# Patient Record
Sex: FEMALE | Race: AMERICAN INDIAN OR ALASKA NATIVE | NOT HISPANIC OR LATINO
[De-identification: names, ages, dates, MRNs, and addresses within clinical notes are randomized per-mention and may not be internally consistent; named-entity substitution may affect disease eponyms.]

---

## 2019-04-25 PROBLEM — Z00.00 ENCOUNTER FOR PREVENTIVE HEALTH EXAMINATION: Status: ACTIVE | Noted: 2019-04-25

## 2019-05-01 ENCOUNTER — ASOB RESULT (OUTPATIENT)
Age: 24
End: 2019-05-01

## 2019-05-01 ENCOUNTER — APPOINTMENT (OUTPATIENT)
Dept: OBGYN | Facility: CLINIC | Age: 24
End: 2019-05-01
Payer: MEDICAID

## 2019-05-01 VITALS
WEIGHT: 119.25 LBS | HEART RATE: 80 BPM | DIASTOLIC BLOOD PRESSURE: 78 MMHG | BODY MASS INDEX: 19.16 KG/M2 | HEIGHT: 66 IN | SYSTOLIC BLOOD PRESSURE: 118 MMHG

## 2019-05-01 DIAGNOSIS — Z78.9 OTHER SPECIFIED HEALTH STATUS: ICD-10-CM

## 2019-05-01 DIAGNOSIS — O36.80X0 PREGNANCY WITH INCONCLUSIVE FETAL VIABILITY, NOT APPLICABLE OR UNSPECIFIED: ICD-10-CM

## 2019-05-01 PROCEDURE — 99202 OFFICE O/P NEW SF 15 MIN: CPT

## 2019-05-01 PROCEDURE — 76817 TRANSVAGINAL US OBSTETRIC: CPT

## 2019-05-03 ENCOUNTER — APPOINTMENT (OUTPATIENT)
Dept: OBGYN | Facility: CLINIC | Age: 24
End: 2019-05-03

## 2019-05-07 ENCOUNTER — OTHER (OUTPATIENT)
Age: 24
End: 2019-05-07

## 2019-05-09 ENCOUNTER — EMERGENCY (EMERGENCY)
Facility: HOSPITAL | Age: 24
LOS: 1 days | Discharge: HOME | End: 2019-05-09
Attending: EMERGENCY MEDICINE | Admitting: EMERGENCY MEDICINE
Payer: MEDICAID

## 2019-05-09 VITALS
TEMPERATURE: 98 F | RESPIRATION RATE: 16 BRPM | OXYGEN SATURATION: 100 % | DIASTOLIC BLOOD PRESSURE: 51 MMHG | HEART RATE: 98 BPM | SYSTOLIC BLOOD PRESSURE: 104 MMHG

## 2019-05-09 LAB
APPEARANCE UR: CLEAR — SIGNIFICANT CHANGE UP
BASOPHILS # BLD AUTO: 0.03 K/UL — SIGNIFICANT CHANGE UP (ref 0–0.2)
BASOPHILS NFR BLD AUTO: 0.4 % — SIGNIFICANT CHANGE UP (ref 0–2)
BILIRUB UR-MCNC: NEGATIVE — SIGNIFICANT CHANGE UP
BLOOD UR QL VISUAL: NEGATIVE — SIGNIFICANT CHANGE UP
COLOR SPEC: SIGNIFICANT CHANGE UP
EOSINOPHIL # BLD AUTO: 0.22 K/UL — SIGNIFICANT CHANGE UP (ref 0–0.5)
EOSINOPHIL NFR BLD AUTO: 2.8 % — SIGNIFICANT CHANGE UP (ref 0–6)
GLUCOSE UR-MCNC: NEGATIVE — SIGNIFICANT CHANGE UP
HCT VFR BLD CALC: 37.7 % — SIGNIFICANT CHANGE UP (ref 34.5–45)
HGB BLD-MCNC: 11.8 G/DL — SIGNIFICANT CHANGE UP (ref 11.5–15.5)
IMM GRANULOCYTES NFR BLD AUTO: 0.1 % — SIGNIFICANT CHANGE UP (ref 0–1.5)
KETONES UR-MCNC: NEGATIVE — SIGNIFICANT CHANGE UP
LEUKOCYTE ESTERASE UR-ACNC: NEGATIVE — SIGNIFICANT CHANGE UP
LYMPHOCYTES # BLD AUTO: 2.71 K/UL — SIGNIFICANT CHANGE UP (ref 1–3.3)
LYMPHOCYTES # BLD AUTO: 35 % — SIGNIFICANT CHANGE UP (ref 13–44)
MCHC RBC-ENTMCNC: 24.7 PG — LOW (ref 27–34)
MCHC RBC-ENTMCNC: 31.3 % — LOW (ref 32–36)
MCV RBC AUTO: 79 FL — LOW (ref 80–100)
MONOCYTES # BLD AUTO: 0.63 K/UL — SIGNIFICANT CHANGE UP (ref 0–0.9)
MONOCYTES NFR BLD AUTO: 8.1 % — SIGNIFICANT CHANGE UP (ref 2–14)
NEUTROPHILS # BLD AUTO: 4.15 K/UL — SIGNIFICANT CHANGE UP (ref 1.8–7.4)
NEUTROPHILS NFR BLD AUTO: 53.6 % — SIGNIFICANT CHANGE UP (ref 43–77)
NITRITE UR-MCNC: NEGATIVE — SIGNIFICANT CHANGE UP
NRBC # FLD: 0 K/UL — SIGNIFICANT CHANGE UP (ref 0–0)
PH UR: 7 — SIGNIFICANT CHANGE UP (ref 5–8)
PLATELET # BLD AUTO: 240 K/UL — SIGNIFICANT CHANGE UP (ref 150–400)
PMV BLD: 11.3 FL — SIGNIFICANT CHANGE UP (ref 7–13)
PROT UR-MCNC: NEGATIVE — SIGNIFICANT CHANGE UP
RBC # BLD: 4.77 M/UL — SIGNIFICANT CHANGE UP (ref 3.8–5.2)
RBC # FLD: 13.9 % — SIGNIFICANT CHANGE UP (ref 10.3–14.5)
SP GR SPEC: 1.01 — SIGNIFICANT CHANGE UP (ref 1–1.04)
UROBILINOGEN FLD QL: NORMAL — SIGNIFICANT CHANGE UP
WBC # BLD: 7.75 K/UL — SIGNIFICANT CHANGE UP (ref 3.8–10.5)
WBC # FLD AUTO: 7.75 K/UL — SIGNIFICANT CHANGE UP (ref 3.8–10.5)

## 2019-05-09 PROCEDURE — 76817 TRANSVAGINAL US OBSTETRIC: CPT | Mod: 26

## 2019-05-09 PROCEDURE — 99285 EMERGENCY DEPT VISIT HI MDM: CPT

## 2019-05-09 NOTE — ED PROVIDER NOTE - OBJECTIVE STATEMENT
22yo F no pmx  ~5 weeks pregnant (LMP ) here with vaginal spotting this morning with 1 episode of 1 tbsp amount of bleeding. Saw OBGYN at [facility] who performed US showing gestational sac but no fetal pole/yolk sac. No abd pain, fever, urinary sx, n/v/d.

## 2019-05-09 NOTE — ED ADULT NURSE NOTE - PAIN RATING/NUMBER SCALE (0-10): REST
Detail Level: Simple
Number Of Freeze-Thaw Cycles: 1 freeze-thaw cycle
Render Post-Care Instructions In Note?: no
Post-Care Instructions: I reviewed with the patient in detail post-care instructions. Patient is to wear sunprotection, and avoid picking at any of the treated lesions. Pt may apply Vaseline to crusted or scabbing areas.
Medical Necessity Information: It is in your best interest to select a reason for this procedure from the list below. All of these items fulfill various CMS LCD requirements except the new and changing color options.
Medical Necessity Clause: This procedure was medically necessary because the lesions that were treated were:
Consent: The patient's consent was obtained including but not limited to risks of crusting, scabbing, blistering, scarring, darker or lighter pigmentary change, recurrence, incomplete removal and infection.
Duration Of Freeze Thaw-Cycle (Seconds): 5
0

## 2019-05-09 NOTE — ED ADULT NURSE NOTE - OBJECTIVE STATEMENT
Pt received to intake room 1. Pt states she noticed vaginal bleeding this afternoon. Pt states when she wiped, she noticed blood on the toilet paper. Pt endorses using 1 pad total. Pt denies seeing clots. Pt denies pelvic pain, dyspnea, chest pain, N/V/D, abd pain and all other acute complaints. 20 G IV placed in right AC, labs drawn & sent, pt is A&Ox3 and ambulates independently.

## 2019-05-09 NOTE — ED PROVIDER NOTE - CLINICAL SUMMARY MEDICAL DECISION MAKING FREE TEXT BOX
R/o ectopic v threatened AB. If only gestational sac seen again will need f/u in 2 days for rpt hcg and US.

## 2019-05-09 NOTE — ED PROVIDER NOTE - PHYSICAL EXAMINATION
Gen: Well appearing, NAD  Head: NCAT  HEENT: PERRL, MMM, normal conjunctiva, anicteric, neck supple  Lung: CTAB, no adventitious sounds  CV: RRR, no murmurs  Abd: soft, NTND, no rebound or guarding, no CVAT  MSK: No edema, no visible deformities  Neuro: Moving all extremities grossly  Skin: Warm and dry, no evidence of rash  Psych: normal mood and affect Gen: Well appearing, NAD  Head: NCAT  HEENT: PERRL, MMM, normal conjunctiva, anicteric, neck supple  Lung: CTAB, no adventitious sounds  CV: RRR, no murmurs  Abd: soft, NTND, no rebound or guarding, no CVAT  MSK: No edema, no visible deformities  Neuro: Moving all extremities grossly  Skin: Warm and dry, no evidence of rash  Psych: normal mood and affect  Pelvic: No gross blood visualized

## 2019-05-10 VITALS
TEMPERATURE: 98 F | SYSTOLIC BLOOD PRESSURE: 99 MMHG | RESPIRATION RATE: 17 BRPM | OXYGEN SATURATION: 100 % | DIASTOLIC BLOOD PRESSURE: 61 MMHG | HEART RATE: 76 BPM

## 2019-05-10 LAB
ALBUMIN SERPL ELPH-MCNC: 4.6 G/DL — SIGNIFICANT CHANGE UP (ref 3.3–5)
ALP SERPL-CCNC: 34 U/L — LOW (ref 40–120)
ALT FLD-CCNC: 16 U/L — SIGNIFICANT CHANGE UP (ref 4–33)
ANION GAP SERPL CALC-SCNC: 11 MMO/L — SIGNIFICANT CHANGE UP (ref 7–14)
AST SERPL-CCNC: 17 U/L — SIGNIFICANT CHANGE UP (ref 4–32)
BILIRUB SERPL-MCNC: < 0.2 MG/DL — LOW (ref 0.2–1.2)
BLD GP AB SCN SERPL QL: NEGATIVE — SIGNIFICANT CHANGE UP
BUN SERPL-MCNC: 7 MG/DL — SIGNIFICANT CHANGE UP (ref 7–23)
CALCIUM SERPL-MCNC: 9.2 MG/DL — SIGNIFICANT CHANGE UP (ref 8.4–10.5)
CHLORIDE SERPL-SCNC: 103 MMOL/L — SIGNIFICANT CHANGE UP (ref 98–107)
CO2 SERPL-SCNC: 25 MMOL/L — SIGNIFICANT CHANGE UP (ref 22–31)
CREAT SERPL-MCNC: 0.5 MG/DL — SIGNIFICANT CHANGE UP (ref 0.5–1.3)
GLUCOSE SERPL-MCNC: 84 MG/DL — SIGNIFICANT CHANGE UP (ref 70–99)
HCG SERPL-ACNC: SIGNIFICANT CHANGE UP MIU/ML
POTASSIUM SERPL-MCNC: 3.9 MMOL/L — SIGNIFICANT CHANGE UP (ref 3.5–5.3)
POTASSIUM SERPL-SCNC: 3.9 MMOL/L — SIGNIFICANT CHANGE UP (ref 3.5–5.3)
PROT SERPL-MCNC: 7.6 G/DL — SIGNIFICANT CHANGE UP (ref 6–8.3)
RH IG SCN BLD-IMP: POSITIVE — SIGNIFICANT CHANGE UP
SODIUM SERPL-SCNC: 139 MMOL/L — SIGNIFICANT CHANGE UP (ref 135–145)

## 2019-05-11 LAB
BACTERIA UR CULT: SIGNIFICANT CHANGE UP
SPECIMEN SOURCE: SIGNIFICANT CHANGE UP

## 2019-05-11 RX ORDER — PRENATAL VIT NO.130/IRON/FOLIC 27MG-0.8MG
TABLET ORAL
Refills: 0 | Status: ACTIVE | COMMUNITY

## 2019-05-12 NOTE — ED POST DISCHARGE NOTE - RESULT SUMMARY
culture grew 3 or more types of organisms  which indicate collection contamination, consider recollection only if clinically indicated. Patient pregnant. Patient contact #  discussed with patient needs repeat UA/UCX. Discussed with patient need to return to ED if symptoms don't continue to improve or recur or develops any new or worsening symptoms that are of concern.

## 2019-05-15 ENCOUNTER — ASOB RESULT (OUTPATIENT)
Age: 24
End: 2019-05-15

## 2019-05-15 ENCOUNTER — APPOINTMENT (OUTPATIENT)
Dept: OBGYN | Facility: CLINIC | Age: 24
End: 2019-05-15
Payer: MEDICAID

## 2019-05-15 ENCOUNTER — OTHER (OUTPATIENT)
Age: 24
End: 2019-05-15

## 2019-05-15 PROCEDURE — 76817 TRANSVAGINAL US OBSTETRIC: CPT

## 2019-05-22 PROBLEM — Z78.9 DOES NOT USE ILLICIT DRUGS: Status: ACTIVE | Noted: 2019-05-22

## 2019-05-22 PROBLEM — Z78.9 NEVER SMOKED TOBACCO: Status: ACTIVE | Noted: 2019-05-22

## 2019-05-22 PROBLEM — Z78.9 NO PERTINENT PAST SURGICAL HISTORY: Status: RESOLVED | Noted: 2019-05-22 | Resolved: 2019-05-22

## 2019-05-22 PROBLEM — Z78.9 DENIES ALCOHOL CONSUMPTION: Status: ACTIVE | Noted: 2019-05-22

## 2019-05-22 LAB
BACTERIA UR CULT: NORMAL
C TRACH RRNA SPEC QL NAA+PROBE: NOT DETECTED
HCG SERPL-MCNC: 1595 MIU/ML
HCG SERPL-MCNC: 3185 MIU/ML
N GONORRHOEA RRNA SPEC QL NAA+PROBE: NOT DETECTED
PROGEST SERPL-MCNC: 13.5 NG/ML
SOURCE AMPLIFICATION: NORMAL

## 2019-05-29 ENCOUNTER — APPOINTMENT (OUTPATIENT)
Dept: OBGYN | Facility: CLINIC | Age: 24
End: 2019-05-29
Payer: MEDICAID

## 2019-05-29 VITALS
HEIGHT: 66 IN | WEIGHT: 120 LBS | DIASTOLIC BLOOD PRESSURE: 74 MMHG | BODY MASS INDEX: 19.29 KG/M2 | SYSTOLIC BLOOD PRESSURE: 112 MMHG

## 2019-05-29 DIAGNOSIS — Z34.81 ENCOUNTER FOR SUPERVISION OF OTHER NORMAL PREGNANCY, FIRST TRIMESTER: ICD-10-CM

## 2019-05-29 DIAGNOSIS — O21.9 VOMITING OF PREGNANCY, UNSPECIFIED: ICD-10-CM

## 2019-05-29 DIAGNOSIS — O09.299 SUPERVISION OF PREGNANCY WITH OTHER POOR REPRODUCTIVE OR OBSTETRIC HISTORY, UNSPECIFIED TRIMESTER: ICD-10-CM

## 2019-05-29 PROCEDURE — 0501F PRENATAL FLOW SHEET: CPT

## 2019-06-26 ENCOUNTER — APPOINTMENT (OUTPATIENT)
Dept: OBGYN | Facility: CLINIC | Age: 24
End: 2019-06-26
Payer: MEDICAID

## 2019-06-26 ENCOUNTER — NON-APPOINTMENT (OUTPATIENT)
Age: 24
End: 2019-06-26

## 2019-06-26 ENCOUNTER — ASOB RESULT (OUTPATIENT)
Age: 24
End: 2019-06-26

## 2019-06-26 ENCOUNTER — LABORATORY RESULT (OUTPATIENT)
Age: 24
End: 2019-06-26

## 2019-06-26 ENCOUNTER — APPOINTMENT (OUTPATIENT)
Dept: ANTEPARTUM | Facility: CLINIC | Age: 24
End: 2019-06-26
Payer: MEDICAID

## 2019-06-26 VITALS
SYSTOLIC BLOOD PRESSURE: 110 MMHG | WEIGHT: 119 LBS | DIASTOLIC BLOOD PRESSURE: 68 MMHG | HEIGHT: 66 IN | BODY MASS INDEX: 19.13 KG/M2

## 2019-06-26 DIAGNOSIS — Z34.92 ENCOUNTER FOR SUPERVISION OF NORMAL PREGNANCY, UNSPECIFIED, SECOND TRIMESTER: ICD-10-CM

## 2019-06-26 PROCEDURE — 0502F SUBSEQUENT PRENATAL CARE: CPT

## 2019-06-26 PROCEDURE — 76813 OB US NUCHAL MEAS 1 GEST: CPT

## 2019-06-26 PROCEDURE — 36416 COLLJ CAPILLARY BLOOD SPEC: CPT

## 2019-06-26 PROCEDURE — 76801 OB US < 14 WKS SINGLE FETUS: CPT

## 2019-06-26 RX ORDER — MULTIVITAMIN WITH FOLIC ACID 400 MCG
TABLET ORAL
Qty: 30 | Refills: 0 | Status: DISCONTINUED | COMMUNITY
Start: 2019-02-06

## 2019-06-26 RX ORDER — IBUPROFEN 400 MG/1
400 TABLET, FILM COATED ORAL
Qty: 60 | Refills: 0 | Status: DISCONTINUED | COMMUNITY
Start: 2019-02-06

## 2019-06-27 ENCOUNTER — OTHER (OUTPATIENT)
Age: 24
End: 2019-06-27

## 2019-07-25 ENCOUNTER — LABORATORY RESULT (OUTPATIENT)
Age: 24
End: 2019-07-25

## 2019-07-25 ENCOUNTER — APPOINTMENT (OUTPATIENT)
Dept: OBGYN | Facility: CLINIC | Age: 24
End: 2019-07-25

## 2019-07-25 ENCOUNTER — NON-APPOINTMENT (OUTPATIENT)
Age: 24
End: 2019-07-25

## 2019-07-25 VITALS
BODY MASS INDEX: 19.77 KG/M2 | SYSTOLIC BLOOD PRESSURE: 110 MMHG | DIASTOLIC BLOOD PRESSURE: 68 MMHG | HEIGHT: 66 IN | WEIGHT: 123 LBS

## 2019-07-25 PROBLEM — Z34.92 ENCOUNTER FOR PREGNANCY RELATED EXAMINATION IN SECOND TRIMESTER: Status: ACTIVE | Noted: 2019-07-25

## 2019-07-25 RX ORDER — DOXYLAMINE SUCCINATE AND PYRIDOXINE HYDROCHLORIDE 10; 10 MG/1; MG/1
10-10 TABLET, DELAYED RELEASE ORAL
Qty: 15 | Refills: 2 | Status: DISCONTINUED | COMMUNITY
Start: 2019-06-26 | End: 2019-07-25

## 2019-07-29 ENCOUNTER — OTHER (OUTPATIENT)
Age: 24
End: 2019-07-29

## 2019-08-16 ENCOUNTER — APPOINTMENT (OUTPATIENT)
Dept: OBGYN | Facility: CLINIC | Age: 24
End: 2019-08-16

## 2019-08-19 ENCOUNTER — NON-APPOINTMENT (OUTPATIENT)
Age: 24
End: 2019-08-19

## 2019-08-19 ENCOUNTER — ASOB RESULT (OUTPATIENT)
Age: 24
End: 2019-08-19

## 2019-08-19 ENCOUNTER — APPOINTMENT (OUTPATIENT)
Dept: OBGYN | Facility: CLINIC | Age: 24
End: 2019-08-19
Payer: MEDICAID

## 2019-08-19 ENCOUNTER — APPOINTMENT (OUTPATIENT)
Dept: ANTEPARTUM | Facility: CLINIC | Age: 24
End: 2019-08-19
Payer: MEDICAID

## 2019-08-19 VITALS
DIASTOLIC BLOOD PRESSURE: 69 MMHG | SYSTOLIC BLOOD PRESSURE: 109 MMHG | BODY MASS INDEX: 20.57 KG/M2 | HEIGHT: 66 IN | WEIGHT: 128 LBS

## 2019-08-19 PROCEDURE — 0502F SUBSEQUENT PRENATAL CARE: CPT

## 2019-08-19 PROCEDURE — 76811 OB US DETAILED SNGL FETUS: CPT

## 2019-08-19 RX ORDER — LORATADINE 10 MG/1
10 TABLET ORAL
Qty: 30 | Refills: 0 | Status: ACTIVE | COMMUNITY
Start: 2019-08-06

## 2019-08-19 RX ORDER — ACETAMINOPHEN 325 MG/1
325 TABLET ORAL
Qty: 40 | Refills: 0 | Status: DISCONTINUED | COMMUNITY
Start: 2019-08-06

## 2019-09-03 ENCOUNTER — OTHER (OUTPATIENT)
Age: 24
End: 2019-09-03

## 2019-09-17 ENCOUNTER — APPOINTMENT (OUTPATIENT)
Dept: ANTEPARTUM | Facility: CLINIC | Age: 24
End: 2019-09-17
Payer: MEDICAID

## 2019-09-17 ENCOUNTER — ASOB RESULT (OUTPATIENT)
Age: 24
End: 2019-09-17

## 2019-09-17 ENCOUNTER — APPOINTMENT (OUTPATIENT)
Dept: OBGYN | Facility: CLINIC | Age: 24
End: 2019-09-17
Payer: MEDICAID

## 2019-09-17 VITALS
HEIGHT: 66 IN | WEIGHT: 132.44 LBS | DIASTOLIC BLOOD PRESSURE: 60 MMHG | BODY MASS INDEX: 21.28 KG/M2 | SYSTOLIC BLOOD PRESSURE: 110 MMHG

## 2019-09-17 DIAGNOSIS — Z34.82 ENCOUNTER FOR SUPERVISION OF OTHER NORMAL PREGNANCY, SECOND TRIMESTER: ICD-10-CM

## 2019-09-17 PROCEDURE — 0502F SUBSEQUENT PRENATAL CARE: CPT

## 2019-09-17 PROCEDURE — 76816 OB US FOLLOW-UP PER FETUS: CPT

## 2019-09-24 ENCOUNTER — APPOINTMENT (OUTPATIENT)
Dept: OBGYN | Facility: CLINIC | Age: 24
End: 2019-09-24

## 2019-10-22 ENCOUNTER — APPOINTMENT (OUTPATIENT)
Dept: OBGYN | Facility: CLINIC | Age: 24
End: 2019-10-22
Payer: MEDICAID

## 2019-10-22 ENCOUNTER — ASOB RESULT (OUTPATIENT)
Age: 24
End: 2019-10-22

## 2019-10-22 ENCOUNTER — APPOINTMENT (OUTPATIENT)
Dept: ANTEPARTUM | Facility: CLINIC | Age: 24
End: 2019-10-22
Payer: MEDICAID

## 2019-10-22 VITALS
WEIGHT: 140.25 LBS | DIASTOLIC BLOOD PRESSURE: 60 MMHG | HEIGHT: 66 IN | BODY MASS INDEX: 22.54 KG/M2 | SYSTOLIC BLOOD PRESSURE: 98 MMHG

## 2019-10-22 PROCEDURE — 90471 IMMUNIZATION ADMIN: CPT

## 2019-10-22 PROCEDURE — 76819 FETAL BIOPHYS PROFIL W/O NST: CPT

## 2019-10-22 PROCEDURE — 0502F SUBSEQUENT PRENATAL CARE: CPT

## 2019-10-22 PROCEDURE — 76816 OB US FOLLOW-UP PER FETUS: CPT

## 2019-10-22 PROCEDURE — 90715 TDAP VACCINE 7 YRS/> IM: CPT

## 2019-11-07 ENCOUNTER — NON-APPOINTMENT (OUTPATIENT)
Age: 24
End: 2019-11-07

## 2019-11-07 ENCOUNTER — ASOB RESULT (OUTPATIENT)
Age: 24
End: 2019-11-07

## 2019-11-07 ENCOUNTER — APPOINTMENT (OUTPATIENT)
Dept: OBGYN | Facility: CLINIC | Age: 24
End: 2019-11-07
Payer: MEDICAID

## 2019-11-07 ENCOUNTER — OUTPATIENT (OUTPATIENT)
Dept: OUTPATIENT SERVICES | Facility: HOSPITAL | Age: 24
LOS: 1 days | End: 2019-11-07

## 2019-11-07 ENCOUNTER — APPOINTMENT (OUTPATIENT)
Dept: ANTEPARTUM | Facility: HOSPITAL | Age: 24
End: 2019-11-07

## 2019-11-07 ENCOUNTER — APPOINTMENT (OUTPATIENT)
Dept: ANTEPARTUM | Facility: CLINIC | Age: 24
End: 2019-11-07
Payer: MEDICAID

## 2019-11-07 VITALS
BODY MASS INDEX: 22.99 KG/M2 | SYSTOLIC BLOOD PRESSURE: 110 MMHG | HEIGHT: 66 IN | DIASTOLIC BLOOD PRESSURE: 69 MMHG | WEIGHT: 143.06 LBS

## 2019-11-07 PROCEDURE — 76818 FETAL BIOPHYS PROFILE W/NST: CPT | Mod: 26

## 2019-11-07 PROCEDURE — 90471 IMMUNIZATION ADMIN: CPT

## 2019-11-07 PROCEDURE — 90686 IIV4 VACC NO PRSV 0.5 ML IM: CPT

## 2019-11-07 PROCEDURE — 0502F SUBSEQUENT PRENATAL CARE: CPT

## 2019-11-14 ENCOUNTER — ASOB RESULT (OUTPATIENT)
Age: 24
End: 2019-11-14

## 2019-11-14 ENCOUNTER — APPOINTMENT (OUTPATIENT)
Dept: ANTEPARTUM | Facility: CLINIC | Age: 24
End: 2019-11-14
Payer: MEDICAID

## 2019-11-14 ENCOUNTER — OUTPATIENT (OUTPATIENT)
Dept: OUTPATIENT SERVICES | Facility: HOSPITAL | Age: 24
LOS: 1 days | End: 2019-11-14

## 2019-11-14 ENCOUNTER — APPOINTMENT (OUTPATIENT)
Dept: ANTEPARTUM | Facility: HOSPITAL | Age: 24
End: 2019-11-14

## 2019-11-14 ENCOUNTER — APPOINTMENT (OUTPATIENT)
Dept: ANTEPARTUM | Facility: CLINIC | Age: 24
End: 2019-11-14

## 2019-11-14 PROCEDURE — 76818 FETAL BIOPHYS PROFILE W/NST: CPT | Mod: 26

## 2019-11-19 ENCOUNTER — APPOINTMENT (OUTPATIENT)
Dept: ANTEPARTUM | Facility: CLINIC | Age: 24
End: 2019-11-19
Payer: MEDICAID

## 2019-11-19 ENCOUNTER — ASOB RESULT (OUTPATIENT)
Age: 24
End: 2019-11-19

## 2019-11-19 ENCOUNTER — OUTPATIENT (OUTPATIENT)
Dept: OUTPATIENT SERVICES | Facility: HOSPITAL | Age: 24
LOS: 1 days | End: 2019-11-19

## 2019-11-19 PROCEDURE — 76816 OB US FOLLOW-UP PER FETUS: CPT

## 2019-11-19 PROCEDURE — 76818 FETAL BIOPHYS PROFILE W/NST: CPT | Mod: 26

## 2019-11-21 ENCOUNTER — APPOINTMENT (OUTPATIENT)
Dept: OBGYN | Facility: CLINIC | Age: 24
End: 2019-11-21
Payer: MEDICAID

## 2019-11-21 ENCOUNTER — NON-APPOINTMENT (OUTPATIENT)
Age: 24
End: 2019-11-21

## 2019-11-21 VITALS
SYSTOLIC BLOOD PRESSURE: 111 MMHG | DIASTOLIC BLOOD PRESSURE: 72 MMHG | BODY MASS INDEX: 23.78 KG/M2 | HEIGHT: 66 IN | WEIGHT: 148 LBS

## 2019-11-21 PROCEDURE — 0502F SUBSEQUENT PRENATAL CARE: CPT

## 2019-11-22 ENCOUNTER — NON-APPOINTMENT (OUTPATIENT)
Age: 24
End: 2019-11-22

## 2019-11-26 ENCOUNTER — APPOINTMENT (OUTPATIENT)
Dept: ANTEPARTUM | Facility: CLINIC | Age: 24
End: 2019-11-26

## 2019-11-26 ENCOUNTER — APPOINTMENT (OUTPATIENT)
Dept: ANTEPARTUM | Facility: CLINIC | Age: 24
End: 2019-11-26
Payer: MEDICAID

## 2019-11-26 ENCOUNTER — OUTPATIENT (OUTPATIENT)
Dept: OUTPATIENT SERVICES | Facility: HOSPITAL | Age: 24
LOS: 1 days | End: 2019-11-26

## 2019-11-26 ENCOUNTER — ASOB RESULT (OUTPATIENT)
Age: 24
End: 2019-11-26

## 2019-11-26 PROCEDURE — 76818 FETAL BIOPHYS PROFILE W/NST: CPT | Mod: 26

## 2019-12-03 ENCOUNTER — APPOINTMENT (OUTPATIENT)
Dept: ANTEPARTUM | Facility: CLINIC | Age: 24
End: 2019-12-03
Payer: MEDICAID

## 2019-12-03 ENCOUNTER — OUTPATIENT (OUTPATIENT)
Dept: OUTPATIENT SERVICES | Facility: HOSPITAL | Age: 24
LOS: 1 days | End: 2019-12-03

## 2019-12-03 ENCOUNTER — ASOB RESULT (OUTPATIENT)
Age: 24
End: 2019-12-03

## 2019-12-03 PROCEDURE — 76818 FETAL BIOPHYS PROFILE W/NST: CPT | Mod: 26

## 2019-12-04 DIAGNOSIS — Z3A.31 31 WEEKS GESTATION OF PREGNANCY: ICD-10-CM

## 2019-12-04 DIAGNOSIS — O09.293 SUPERVISION OF PREGNANCY WITH OTHER POOR REPRODUCTIVE OR OBSTETRIC HISTORY, THIRD TRIMESTER: ICD-10-CM

## 2019-12-04 DIAGNOSIS — O28.1 ABNORMAL BIOCHEMICAL FINDING ON ANTENATAL SCREENING OF MOTHER: ICD-10-CM

## 2019-12-05 ENCOUNTER — APPOINTMENT (OUTPATIENT)
Dept: OBGYN | Facility: CLINIC | Age: 24
End: 2019-12-05
Payer: MEDICAID

## 2019-12-05 VITALS
SYSTOLIC BLOOD PRESSURE: 118 MMHG | DIASTOLIC BLOOD PRESSURE: 76 MMHG | HEIGHT: 66 IN | BODY MASS INDEX: 23.78 KG/M2 | WEIGHT: 148 LBS

## 2019-12-05 PROCEDURE — 0502F SUBSEQUENT PRENATAL CARE: CPT

## 2019-12-09 ENCOUNTER — INPATIENT (INPATIENT)
Facility: HOSPITAL | Age: 24
LOS: 0 days | Discharge: HOME | End: 2019-12-10
Attending: OBSTETRICS & GYNECOLOGY | Admitting: OBSTETRICS & GYNECOLOGY
Payer: MEDICAID

## 2019-12-09 ENCOUNTER — TRANSCRIPTION ENCOUNTER (OUTPATIENT)
Age: 24
End: 2019-12-09

## 2019-12-09 VITALS
RESPIRATION RATE: 14 BRPM | OXYGEN SATURATION: 99 % | HEART RATE: 82 BPM | DIASTOLIC BLOOD PRESSURE: 64 MMHG | SYSTOLIC BLOOD PRESSURE: 99 MMHG | TEMPERATURE: 98 F

## 2019-12-09 DIAGNOSIS — Z3A.00 WEEKS OF GESTATION OF PREGNANCY NOT SPECIFIED: ICD-10-CM

## 2019-12-09 DIAGNOSIS — O26.899 OTHER SPECIFIED PREGNANCY RELATED CONDITIONS, UNSPECIFIED TRIMESTER: ICD-10-CM

## 2019-12-09 LAB
APPEARANCE UR: CLEAR — SIGNIFICANT CHANGE UP
BASOPHILS # BLD AUTO: 0.02 K/UL — SIGNIFICANT CHANGE UP (ref 0–0.2)
BASOPHILS NFR BLD AUTO: 0.2 % — SIGNIFICANT CHANGE UP (ref 0–2)
BILIRUB UR-MCNC: NEGATIVE — SIGNIFICANT CHANGE UP
BLD GP AB SCN SERPL QL: NEGATIVE — SIGNIFICANT CHANGE UP
BLOOD UR QL VISUAL: NEGATIVE — SIGNIFICANT CHANGE UP
COLOR SPEC: SIGNIFICANT CHANGE UP
EOSINOPHIL # BLD AUTO: 0.11 K/UL — SIGNIFICANT CHANGE UP (ref 0–0.5)
EOSINOPHIL NFR BLD AUTO: 1.3 % — SIGNIFICANT CHANGE UP (ref 0–6)
FIBRINOGEN PPP-MCNC: 595.5 MG/DL — HIGH (ref 350–510)
GLUCOSE UR-MCNC: NEGATIVE — SIGNIFICANT CHANGE UP
HCT VFR BLD CALC: 35.8 % — SIGNIFICANT CHANGE UP (ref 34.5–45)
HGB BLD-MCNC: 11.4 G/DL — LOW (ref 11.5–15.5)
IMM GRANULOCYTES NFR BLD AUTO: 0.3 % — SIGNIFICANT CHANGE UP (ref 0–1.5)
KETONES UR-MCNC: NEGATIVE — SIGNIFICANT CHANGE UP
LEUKOCYTE ESTERASE UR-ACNC: NEGATIVE — SIGNIFICANT CHANGE UP
LYMPHOCYTES # BLD AUTO: 2.12 K/UL — SIGNIFICANT CHANGE UP (ref 1–3.3)
LYMPHOCYTES # BLD AUTO: 24.1 % — SIGNIFICANT CHANGE UP (ref 13–44)
MCHC RBC-ENTMCNC: 25 PG — LOW (ref 27–34)
MCHC RBC-ENTMCNC: 31.8 % — LOW (ref 32–36)
MCV RBC AUTO: 78.5 FL — LOW (ref 80–100)
MONOCYTES # BLD AUTO: 0.59 K/UL — SIGNIFICANT CHANGE UP (ref 0–0.9)
MONOCYTES NFR BLD AUTO: 6.7 % — SIGNIFICANT CHANGE UP (ref 2–14)
NEUTROPHILS # BLD AUTO: 5.93 K/UL — SIGNIFICANT CHANGE UP (ref 1.8–7.4)
NEUTROPHILS NFR BLD AUTO: 67.4 % — SIGNIFICANT CHANGE UP (ref 43–77)
NITRITE UR-MCNC: NEGATIVE — SIGNIFICANT CHANGE UP
NRBC # FLD: 0 K/UL — SIGNIFICANT CHANGE UP (ref 0–0)
PH UR: 6.5 — SIGNIFICANT CHANGE UP (ref 5–8)
PLATELET # BLD AUTO: 233 K/UL — SIGNIFICANT CHANGE UP (ref 150–400)
PMV BLD: 11.5 FL — SIGNIFICANT CHANGE UP (ref 7–13)
PROT UR-MCNC: NEGATIVE — SIGNIFICANT CHANGE UP
RBC # BLD: 4.56 M/UL — SIGNIFICANT CHANGE UP (ref 3.8–5.2)
RBC # FLD: 13.3 % — SIGNIFICANT CHANGE UP (ref 10.3–14.5)
RH IG SCN BLD-IMP: POSITIVE — SIGNIFICANT CHANGE UP
SP GR SPEC: 1.01 — SIGNIFICANT CHANGE UP (ref 1–1.04)
T PALLIDUM AB TITR SER: NEGATIVE — SIGNIFICANT CHANGE UP
UROBILINOGEN FLD QL: NORMAL — SIGNIFICANT CHANGE UP
WBC # BLD: 8.8 K/UL — SIGNIFICANT CHANGE UP (ref 3.8–10.5)
WBC # FLD AUTO: 8.8 K/UL — SIGNIFICANT CHANGE UP (ref 3.8–10.5)

## 2019-12-09 PROCEDURE — 99213 OFFICE O/P EST LOW 20 MIN: CPT

## 2019-12-09 RX ORDER — SODIUM CHLORIDE 9 MG/ML
1000 INJECTION, SOLUTION INTRAVENOUS
Refills: 0 | Status: DISCONTINUED | OUTPATIENT
Start: 2019-12-09 | End: 2019-12-10

## 2019-12-09 RX ORDER — ACETAMINOPHEN 500 MG
950 TABLET ORAL ONCE
Refills: 0 | Status: DISCONTINUED | OUTPATIENT
Start: 2019-12-09 | End: 2019-12-09

## 2019-12-09 RX ORDER — SODIUM CHLORIDE 9 MG/ML
1000 INJECTION, SOLUTION INTRAVENOUS ONCE
Refills: 0 | Status: COMPLETED | OUTPATIENT
Start: 2019-12-09 | End: 2019-12-09

## 2019-12-09 RX ORDER — ACETAMINOPHEN 500 MG
975 TABLET ORAL ONCE
Refills: 0 | Status: COMPLETED | OUTPATIENT
Start: 2019-12-09 | End: 2019-12-09

## 2019-12-09 RX ADMIN — Medication 975 MILLIGRAM(S): at 17:15

## 2019-12-09 RX ADMIN — SODIUM CHLORIDE 125 MILLILITER(S): 9 INJECTION, SOLUTION INTRAVENOUS at 05:04

## 2019-12-09 RX ADMIN — Medication 975 MILLIGRAM(S): at 16:15

## 2019-12-09 RX ADMIN — SODIUM CHLORIDE 1000 MILLILITER(S): 9 INJECTION, SOLUTION INTRAVENOUS at 03:15

## 2019-12-09 NOTE — OB PROVIDER TRIAGE NOTE - HISTORY OF PRESENT ILLNESS
23y  presents to triage c/o  Reports +FM, no vaginal bleeding, no ROM or LOF  AP complications: Denies 23y  at 36w3d s/p fall at 12:30am. Pt states while she was feeding her daughter, she tripped and fell on her hands and knees, Denies any abdominal trauma, no head trauma.  States feeling some back pain and also feeling some irregular contractions  Reports +FM, no vaginal bleeding, no ROM or LOF  AP complications: Denies  Pt is scheduled for ECV on 19 and will be scheduled for induction at 39wks. Pt with h/o IUFD at 34wks in .

## 2019-12-09 NOTE — OB PROVIDER H&P - NSHPLABSRESULTS_GEN_ALL_CORE
CBC Full  -  ( 09 Dec 2019 02:50 )  WBC Count : 8.80 K/uL  RBC Count : 4.56 M/uL  Hemoglobin : 11.4 g/dL  Hematocrit : 35.8 %  Platelet Count - Automated : 233 K/uL  Mean Cell Volume : 78.5 fL  Mean Cell Hemoglobin : 25.0 pg  Mean Cell Hemoglobin Concentration : 31.8 %  Auto Neutrophil # : 5.93 K/uL  Auto Lymphocyte # : 2.12 K/uL  Auto Monocyte # : 0.59 K/uL  Auto Eosinophil # : 0.11 K/uL  Auto Basophil # : 0.02 K/uL  Auto Neutrophil % : 67.4 %  Auto Lymphocyte % : 24.1 %  Auto Monocyte % : 6.7 %  Auto Eosinophil % : 1.3 %  Auto Basophil % : 0.2 %    fibrinogen 595.5    Urinalysis Basic - ( 09 Dec 2019 02:50 )    Color: LIGHT YELLOW / Appearance: CLEAR / S.008 / pH: 6.5  Gluc: NEGATIVE / Ketone: NEGATIVE  / Bili: NEGATIVE / Urobili: NORMAL   Blood: NEGATIVE / Protein: NEGATIVE / Nitrite: NEGATIVE   Leuk Esterase: NEGATIVE / RBC: x / WBC x   Sq Epi: x / Non Sq Epi: x / Bacteria: x

## 2019-12-09 NOTE — CHART NOTE - NSCHARTNOTEFT_GEN_A_CORE
S: Pt evaluated at bedside, states still feeling contractions but they are less than before, /10 from 7/10.  No other complaints.    O:   T(C): 36.6 (19 @ 14:04), Max: 36.9 (19 @ 01:42)  HR: 81 (19 @ 14:04) (74 - 87)  BP: 111/59 (19 @ 14:04) (98/66 - 121/90)  RR: 17 (19 @ 14:04) (14 - 17)  SpO2: 99% (19 @ 14:04) (99% - 99%)    Gen: NAD  Abd: soft, non tender, gravid  SVE: /-3  FHT: 140bpm, mod victor m, +ccels, no decels  [place]: cxts irregular    A/P: 22yo  at 36w3d admitted s/p fall at midnight, w c/o contractions  - continue to monitor, for 24h  - cervix unchanged    JADON Vargas PGY3

## 2019-12-09 NOTE — OB RN PATIENT PROFILE - TEMPERATURE IN FAHRENHEIT (DEGREES F)
Detail Level: Simple Price (Do Not Change): 0.00 Instructions: This plan will send the code FBSE to the PM system.  DO NOT or CHANGE the price. 98.2

## 2019-12-09 NOTE — OB PROVIDER H&P - HISTORY OF PRESENT ILLNESS
23y  at 36w3d s/p fall at 12:30am. Pt states while she was feeding her daughter, she tripped and fell on her hands and knees, Denies any abdominal trauma, no head trauma.  States feeling some back pain and also feeling some irregular contractions  Reports +FM, no vaginal bleeding, no ROM or LOF  AP complications: Denies  Pt is scheduled for ECV on 19 and will be scheduled for induction at 39wks. Pt with h/o IUFD at 34wks in .

## 2019-12-09 NOTE — OB PROVIDER TRIAGE NOTE - NSHPPHYSICALEXAM_GEN_ALL_CORE
T(C): 36.8 (12-09-19 @ 02:10), Max: 36.9 (12-09-19 @ 01:42)  HR: 80 (12-09-19 @ 02:11) (80 - 85)  BP: 101/64 (12-09-19 @ 02:11) (98/66 - 101/64)  RR: 14 (12-09-19 @ 01:42) (14 - 14)  SpO2: 99% (12-09-19 @ 01:42) (99% - 99%)    Heart: RRR  Lungs: CTA  Abdomen: Gravid, soft, NT    NST: Reactive with moderate variability, Category 1 tracing  [place]: Regular contractions  VE: 1/50/-3, intact membranes  TAS: SLIUP, Cephalic, CECELIA: 11.6, BPP 8/8, EFW: 3047g

## 2019-12-09 NOTE — OB PROVIDER TRIAGE NOTE - NSOBPROVIDERNOTE_OBGYN_ALL_OB_FT
23y  at 36w3d s/p fall at 12:30am. Pt states while she was feeding her daughter, she tripped and fell on her hands and knees, Denies any abdominal trauma, no head trauma.  States feeling some back pain and also feeling some irregular contractions  Reports +FM, no vaginal bleeding, no ROM or LOF  AP complications: Denies  Pt is scheduled for ECV on 19 and will be scheduled for induction at 39wks. Pt with h/o IUFD at 34wks in .    T(C): 36.8 (19 @ 02:10), Max: 36.9 (19 @ 01:42)  HR: 80 (19 @ 02:11) (80 - 85)  BP: 101/64 (19 @ 02:11) (98/66 - 101/64)  RR: 14 (19 @ 01:42) (14 - 14)  SpO2: 99% (19 @ 01:42) (99% - 99%)    Heart: RRR  Lungs: CTA  Abdomen: Gravid, soft, NT    NST: Reactive with moderate variability, Category 1 tracing  [place]: Regular contractions  VE: 1/50/-3, intact membranes  TAS: SLIUP, Cephalic, CECELIA: 11.6, BPP 8/8, EFW: 3047g    Blood type: AB positive    Pt is cephalic presently  CBC, UA, Fibrinogen sent  IV hydration   Continue prolonged monitoring 23y  at 36w3d s/p fall at 12:30am. Pt states while she was feeding her daughter, she tripped and fell on her hands and knees, Denies any abdominal trauma, no head trauma.  States feeling some back pain and also feeling some irregular contractions  Reports +FM, no vaginal bleeding, no ROM or LOF  AP complications: Denies  Pt is scheduled for ECV on 19 and will be scheduled for induction at 39wks. Pt with h/o IUFD at 34wks in .    T(C): 36.8 (19 @ 02:10), Max: 36.9 (19 @ 01:42)  HR: 80 (19 @ 02:11) (80 - 85)  BP: 101/64 (19 @ 02:11) (98/66 - 101/64)  RR: 14 (19 @ 01:42) (14 - 14)  SpO2: 99% (19 @ 01:42) (99% - 99%)    Heart: RRR  Lungs: CTA  Abdomen: Gravid, soft, NT    NST: Reactive with moderate variability, Category 1 tracing  [place]: Regular contractions  VE: 1/50/-3, intact membranes  TAS: SLIUP, Cephalic, CECELIA: 11.6, BPP 8/8, EFW: 3047g    Blood type: AB positive    Pt is cephalic presently  CBC, UA, Fibrinogen sent  IV hydration   Continue prolonged monitoring    Due to more than 6 contractions in 1 hour pt will be admitted for 24 hour observation    d/w Dr. Hernandez

## 2019-12-09 NOTE — OB PROVIDER TRIAGE NOTE - NSHPLABSRESULTS_GEN_ALL_CORE
CBC, UA, Fibrinogen sent CBC Full  -  ( 09 Dec 2019 02:50 )  WBC Count : 8.80 K/uL  RBC Count : 4.56 M/uL  Hemoglobin : 11.4 g/dL  Hematocrit : 35.8 %  Platelet Count - Automated : 233 K/uL  Mean Cell Volume : 78.5 fL  Mean Cell Hemoglobin : 25.0 pg  Mean Cell Hemoglobin Concentration : 31.8 %  Auto Neutrophil # : 5.93 K/uL  Auto Lymphocyte # : 2.12 K/uL  Auto Monocyte # : 0.59 K/uL  Auto Eosinophil # : 0.11 K/uL  Auto Basophil # : 0.02 K/uL  Auto Neutrophil % : 67.4 %  Auto Lymphocyte % : 24.1 %  Auto Monocyte % : 6.7 %  Auto Eosinophil % : 1.3 %  Auto Basophil % : 0.2 %    fibrinogen 595.5    Urinalysis Basic - ( 09 Dec 2019 02:50 )    Color: LIGHT YELLOW / Appearance: CLEAR / S.008 / pH: 6.5  Gluc: NEGATIVE / Ketone: NEGATIVE  / Bili: NEGATIVE / Urobili: NORMAL   Blood: NEGATIVE / Protein: NEGATIVE / Nitrite: NEGATIVE   Leuk Esterase: NEGATIVE / RBC: x / WBC x   Sq Epi: x / Non Sq Epi: x / Bacteria: x

## 2019-12-09 NOTE — OB PROVIDER H&P - ASSESSMENT
23y  at 36w3d s/p fall at 12:30am. Pt states while she was feeding her daughter, she tripped and fell on her hands and knees, Denies any abdominal trauma, no head trauma.  States feeling some back pain and also feeling some irregular contractions  Reports +FM, no vaginal bleeding, no ROM or LOF  AP complications: Denies  Pt is scheduled for ECV on 19 and will be scheduled for induction at 39wks. Pt with h/o IUFD at 34wks in .    T(C): 36.8 (19 @ 02:10), Max: 36.9 (19 @ 01:42)  HR: 80 (19 @ 02:11) (80 - 85)  BP: 101/64 (19 @ 02:11) (98/66 - 101/64)  RR: 14 (19 @ 01:42) (14 - 14)  SpO2: 99% (19 @ 01:42) (99% - 99%)    Heart: RRR  Lungs: CTA  Abdomen: Gravid, soft, NT    NST: Reactive with moderate variability, Category 1 tracing  [place]: Regular contractions  VE: 1/50/-3, intact membranes  TAS: SLIUP, Cephalic, CECELIA: 11.6, BPP 8/8, EFW: 3047g    Blood type: AB positive    Pt is cephalic presently  CBC, UA, Fibrinogen sent  IV hydration   Continue prolonged monitoring    Due to more than 6 contractions in 1 hour pt will be admitted for 24 hour observation    d/w Dr. Hernandez

## 2019-12-09 NOTE — OB PROVIDER TRIAGE NOTE - NS_OBGYNHISTORY_OBGYN_ALL_OB_FT
GYN: Denies any h/o STDs, fibroids, ovarian Cyst, or abnormal pap smear  OBH: 7/21/15 34wks IUFD: nuchal cord accident wt 4# +    - 16 FT  6#11, no complications

## 2019-12-09 NOTE — OB RN TRIAGE NOTE - PMH
Normal vaginal delivery  07/ 21/2015 IUFD at 34 weeks- nuchal cord accident wt 4# +  05/25/2016  wt 6#11

## 2019-12-09 NOTE — OB PROVIDER H&P - NSHPPHYSICALEXAM_GEN_ALL_CORE
T(C): 36.8 (12-09-19 @ 02:10), Max: 36.9 (12-09-19 @ 01:42)  HR: 80 (12-09-19 @ 02:11) (80 - 85)  BP: 101/64 (12-09-19 @ 02:11) (98/66 - 101/64)  RR: 14 (12-09-19 @ 01:42) (14 - 14)  SpO2: 99% (12-09-19 @ 01:42) (99% - 99%)    Heart: RRR  Lungs: CTA  Abdomen: Gravid, soft, NT    NST: Reactive with moderate variability, Category 1 tracing  [place]: Regular contractions  VE: 1/50/-3, intact membranes  TAS: SLIUP, Cephalic, CECELIA: 11.6, BPP 8/8, EFW: 3047g

## 2019-12-10 VITALS
OXYGEN SATURATION: 99 % | HEART RATE: 86 BPM | DIASTOLIC BLOOD PRESSURE: 62 MMHG | RESPIRATION RATE: 16 BRPM | SYSTOLIC BLOOD PRESSURE: 106 MMHG | TEMPERATURE: 98 F

## 2019-12-10 NOTE — DISCHARGE NOTE ANTEPARTUM - PLAN OF CARE
return to normal pregnancy -pt status post fall admitted for prolonged monitoring. No s/s of abruption or  labor.  -monitoring for 24 with resolution of contractions. No regular contractions at time of discharge.

## 2019-12-10 NOTE — DISCHARGE NOTE ANTEPARTUM - CARE PLAN
Principal Discharge DX:	Labor and delivery, indication for care  Goal:	return to normal pregnancy  Assessment and plan of treatment:	-pt status post fall admitted for prolonged monitoring. No s/s of abruption or  labor.  -monitoring for 24 with resolution of contractions. No regular contractions at time of discharge.

## 2019-12-10 NOTE — DISCHARGE NOTE ANTEPARTUM - CARE PROVIDER_API CALL
Bren Major (MD)  Obstetrics and Gynecology  [facility] [place] [address]  Phone: [phone]  Fax: [phone]  Follow Up Time:

## 2019-12-10 NOTE — DISCHARGE NOTE ANTEPARTUM - PATIENT PORTAL LINK FT
You can access the FollowMyHealth Patient Portal offered by [facility] by registering at the following website: [email or web address]. By joining [facility]’s FollowMyHealth portal, you will also be able to view your health information using other applications (apps) compatible with our system.

## 2019-12-10 NOTE — DISCHARGE NOTE ANTEPARTUM - HOSPITAL COURSE
Patient was admitted for prolonged monitoring status post fall at 12a on . Patient admitted for prolonged monitoring due to intermittent contractions noted on monitor status post fall. Patient without s/s of abruption or  labor; no bleeding, no vaginal bleeding. Fetal monitoring reassuring. Patient to follow up with OB and continue with routine prenatal care    Call your OBGYN w/ any vaginal bleeding, contractions, leakage of fluid.  Call your OBGYN w/ any decreased fetal movement.  Follow up with your OBGYN within 1 week.

## 2019-12-11 ENCOUNTER — APPOINTMENT (OUTPATIENT)
Dept: ANTEPARTUM | Facility: CLINIC | Age: 24
End: 2019-12-11
Payer: MEDICAID

## 2019-12-11 ENCOUNTER — ASOB RESULT (OUTPATIENT)
Age: 24
End: 2019-12-11

## 2019-12-11 ENCOUNTER — APPOINTMENT (OUTPATIENT)
Dept: OBGYN | Facility: CLINIC | Age: 24
End: 2019-12-11
Payer: MEDICAID

## 2019-12-11 ENCOUNTER — APPOINTMENT (OUTPATIENT)
Dept: ANTEPARTUM | Facility: HOSPITAL | Age: 24
End: 2019-12-11
Payer: MEDICAID

## 2019-12-11 ENCOUNTER — OUTPATIENT (OUTPATIENT)
Dept: OUTPATIENT SERVICES | Facility: HOSPITAL | Age: 24
LOS: 1 days | End: 2019-12-11

## 2019-12-11 VITALS
HEIGHT: 66 IN | BODY MASS INDEX: 24.11 KG/M2 | SYSTOLIC BLOOD PRESSURE: 116 MMHG | WEIGHT: 150 LBS | DIASTOLIC BLOOD PRESSURE: 75 MMHG

## 2019-12-11 DIAGNOSIS — O09.43 SUPERVISION OF PREGNANCY WITH GRAND MULTIPARITY, THIRD TRIMESTER: ICD-10-CM

## 2019-12-11 PROCEDURE — 76818 FETAL BIOPHYS PROFILE W/NST: CPT | Mod: 26

## 2019-12-11 PROCEDURE — 76820 UMBILICAL ARTERY ECHO: CPT

## 2019-12-11 PROCEDURE — 0502F SUBSEQUENT PRENATAL CARE: CPT

## 2019-12-11 PROCEDURE — 76816 OB US FOLLOW-UP PER FETUS: CPT

## 2019-12-16 ENCOUNTER — ASOB RESULT (OUTPATIENT)
Age: 24
End: 2019-12-16

## 2019-12-16 ENCOUNTER — APPOINTMENT (OUTPATIENT)
Dept: ANTEPARTUM | Facility: CLINIC | Age: 24
End: 2019-12-16
Payer: MEDICAID

## 2019-12-16 ENCOUNTER — OUTPATIENT (OUTPATIENT)
Dept: OUTPATIENT SERVICES | Facility: HOSPITAL | Age: 24
LOS: 1 days | End: 2019-12-16

## 2019-12-16 ENCOUNTER — APPOINTMENT (OUTPATIENT)
Dept: ANTEPARTUM | Facility: HOSPITAL | Age: 24
End: 2019-12-16

## 2019-12-16 PROCEDURE — 76818 FETAL BIOPHYS PROFILE W/NST: CPT | Mod: 26

## 2019-12-18 DIAGNOSIS — Z3A.32 32 WEEKS GESTATION OF PREGNANCY: ICD-10-CM

## 2019-12-18 DIAGNOSIS — O28.1 ABNORMAL BIOCHEMICAL FINDING ON ANTENATAL SCREENING OF MOTHER: ICD-10-CM

## 2019-12-18 DIAGNOSIS — O09.293 SUPERVISION OF PREGNANCY WITH OTHER POOR REPRODUCTIVE OR OBSTETRIC HISTORY, THIRD TRIMESTER: ICD-10-CM

## 2019-12-19 ENCOUNTER — APPOINTMENT (OUTPATIENT)
Dept: OBGYN | Facility: CLINIC | Age: 24
End: 2019-12-19

## 2019-12-19 ENCOUNTER — ASOB RESULT (OUTPATIENT)
Age: 24
End: 2019-12-19

## 2019-12-19 ENCOUNTER — OUTPATIENT (OUTPATIENT)
Dept: OUTPATIENT SERVICES | Facility: HOSPITAL | Age: 24
LOS: 1 days | End: 2019-12-19

## 2019-12-19 ENCOUNTER — APPOINTMENT (OUTPATIENT)
Dept: ANTEPARTUM | Facility: HOSPITAL | Age: 24
End: 2019-12-19

## 2019-12-19 ENCOUNTER — APPOINTMENT (OUTPATIENT)
Dept: ANTEPARTUM | Facility: CLINIC | Age: 24
End: 2019-12-19
Payer: MEDICAID

## 2019-12-19 VITALS
BODY MASS INDEX: 24.59 KG/M2 | SYSTOLIC BLOOD PRESSURE: 110 MMHG | WEIGHT: 153 LBS | DIASTOLIC BLOOD PRESSURE: 77 MMHG | HEIGHT: 66 IN

## 2019-12-19 DIAGNOSIS — O09.293 SUPERVISION OF PREGNANCY WITH OTHER POOR REPRODUCTIVE OR OBSTETRIC HISTORY, THIRD TRIMESTER: ICD-10-CM

## 2019-12-19 DIAGNOSIS — Z3A.33 33 WEEKS GESTATION OF PREGNANCY: ICD-10-CM

## 2019-12-19 DIAGNOSIS — O28.1 ABNORMAL BIOCHEMICAL FINDING ON ANTENATAL SCREENING OF MOTHER: ICD-10-CM

## 2019-12-19 DIAGNOSIS — Z3A.34 34 WEEKS GESTATION OF PREGNANCY: ICD-10-CM

## 2019-12-19 PROCEDURE — 59025 FETAL NON-STRESS TEST: CPT | Mod: 26

## 2019-12-23 ENCOUNTER — OUTPATIENT (OUTPATIENT)
Dept: OUTPATIENT SERVICES | Facility: HOSPITAL | Age: 24
LOS: 1 days | End: 2019-12-23

## 2019-12-23 ENCOUNTER — APPOINTMENT (OUTPATIENT)
Dept: ANTEPARTUM | Facility: CLINIC | Age: 24
End: 2019-12-23
Payer: MEDICAID

## 2019-12-23 ENCOUNTER — ASOB RESULT (OUTPATIENT)
Age: 24
End: 2019-12-23

## 2019-12-23 ENCOUNTER — APPOINTMENT (OUTPATIENT)
Dept: ANTEPARTUM | Facility: HOSPITAL | Age: 24
End: 2019-12-23

## 2019-12-23 VITALS
DIASTOLIC BLOOD PRESSURE: 70 MMHG | RESPIRATION RATE: 16 BRPM | WEIGHT: 154.1 LBS | SYSTOLIC BLOOD PRESSURE: 110 MMHG | TEMPERATURE: 98 F | OXYGEN SATURATION: 98 % | HEART RATE: 84 BPM | HEIGHT: 66 IN

## 2019-12-23 DIAGNOSIS — O09.899 SUPERVISION OF OTHER HIGH RISK PREGNANCIES, UNSPECIFIED TRIMESTER: ICD-10-CM

## 2019-12-23 DIAGNOSIS — O28.1 ABNORMAL BIOCHEMICAL FINDING ON ANTENATAL SCREENING OF MOTHER: ICD-10-CM

## 2019-12-23 DIAGNOSIS — O09.293 SUPERVISION OF PREGNANCY WITH OTHER POOR REPRODUCTIVE OR OBSTETRIC HISTORY, THIRD TRIMESTER: ICD-10-CM

## 2019-12-23 DIAGNOSIS — Z3A.37 37 WEEKS GESTATION OF PREGNANCY: ICD-10-CM

## 2019-12-23 DIAGNOSIS — O32.9XX0 MATERNAL CARE FOR MALPRESENTATION OF FETUS, UNSPECIFIED, NOT APPLICABLE OR UNSPECIFIED: ICD-10-CM

## 2019-12-23 DIAGNOSIS — Z3A.35 35 WEEKS GESTATION OF PREGNANCY: ICD-10-CM

## 2019-12-23 LAB
APPEARANCE UR: CLEAR — SIGNIFICANT CHANGE UP
BILIRUB UR-MCNC: NEGATIVE — SIGNIFICANT CHANGE UP
BLD GP AB SCN SERPL QL: NEGATIVE — SIGNIFICANT CHANGE UP
BLOOD UR QL VISUAL: NEGATIVE — SIGNIFICANT CHANGE UP
COLOR SPEC: YELLOW — SIGNIFICANT CHANGE UP
GLUCOSE UR-MCNC: NEGATIVE — SIGNIFICANT CHANGE UP
HCT VFR BLD CALC: 37 % — SIGNIFICANT CHANGE UP (ref 34.5–45)
HGB BLD-MCNC: 11.8 G/DL — SIGNIFICANT CHANGE UP (ref 11.5–15.5)
KETONES UR-MCNC: NEGATIVE — SIGNIFICANT CHANGE UP
LEUKOCYTE ESTERASE UR-ACNC: NEGATIVE — SIGNIFICANT CHANGE UP
MCHC RBC-ENTMCNC: 25.6 PG — LOW (ref 27–34)
MCHC RBC-ENTMCNC: 31.9 % — LOW (ref 32–36)
MCV RBC AUTO: 80.3 FL — SIGNIFICANT CHANGE UP (ref 80–100)
NITRITE UR-MCNC: NEGATIVE — SIGNIFICANT CHANGE UP
NRBC # FLD: 0 K/UL — SIGNIFICANT CHANGE UP (ref 0–0)
PH UR: 6 — SIGNIFICANT CHANGE UP (ref 5–8)
PLATELET # BLD AUTO: 222 K/UL — SIGNIFICANT CHANGE UP (ref 150–400)
PMV BLD: 11.7 FL — SIGNIFICANT CHANGE UP (ref 7–13)
PROT UR-MCNC: 10 — SIGNIFICANT CHANGE UP
RBC # BLD: 4.61 M/UL — SIGNIFICANT CHANGE UP (ref 3.8–5.2)
RBC # FLD: 13.9 % — SIGNIFICANT CHANGE UP (ref 10.3–14.5)
RH IG SCN BLD-IMP: POSITIVE — SIGNIFICANT CHANGE UP
SP GR SPEC: 1.02 — SIGNIFICANT CHANGE UP (ref 1–1.04)
UROBILINOGEN FLD QL: NORMAL — SIGNIFICANT CHANGE UP
WBC # BLD: 7.93 K/UL — SIGNIFICANT CHANGE UP (ref 3.8–10.5)
WBC # FLD AUTO: 7.93 K/UL — SIGNIFICANT CHANGE UP (ref 3.8–10.5)

## 2019-12-23 PROCEDURE — 76816 OB US FOLLOW-UP PER FETUS: CPT

## 2019-12-23 PROCEDURE — 76818 FETAL BIOPHYS PROFILE W/NST: CPT | Mod: 26

## 2019-12-23 NOTE — OB PST NOTE - NSHPPHYSICALEXAM_GEN_ALL_CORE
Constitutional: Well Developed, Well Groomed, Well Nourished, No Distress    Eyes: PERRL, EOMI, conjunctiva clear    Ears: Normal    Mouth : moist    Neck: Supple, no JVD, Neck ROM: WDL     Breast: Normal shape    Back: Normal shape, ROM intact, strength intact, no vertebral tenderness    Respiratory: Airway patent, breath sounds equal, good air movement, respiration non-labored, clear to auscultation bilateral, no chest wall tenderness, no intercostal retractions, no rales, no wheezes, no rhonchi, no subcutaneous emphysema    Cardiovascular:  Regular rate and rhythm, +S1, +S2    Gastrointestinal: +     Extremities: No clubbing, cyanosis, or pedal edema    Vascular:  Radial Pulse normal,  DP pulse normal, PT pulse normal    Neurological: alert & oriented x 3, sensation intact, normal strength    Skin: warm and dry, normal color    Lymph Nodes: normal posterior cervical lymph node, normal anterior cervical lymph node, normal supraclavicular lymph node, normal axillary lymph node, normal inguinal lymph node, normal femoral lymph node    Musculoskeletal: ROM intact, no joint swelling, warmth, or calf tenderness. Normal strength    Psychiatric: normal affect, normal behavior

## 2019-12-23 NOTE — OB PST NOTE - HISTORY OF PRESENT ILLNESS
Patient is a 24 year old female presents to [facility] for an evaluation for a scheduled Cytotec induction of labor for history of IUFD and high risk multigravida in third trimester scheduled on 12/29/2019 with Dr. Major. Pre op diagnosis: Supervision of other high risk pregnancies, unspecified trimester. Patient reports positive fetal movements, denies contractions, denies leakage of amniotic fluid. Patient is a 23 year old female presents to [facility] for an evaluation for a scheduled Cytotec induction of labor for history of IUFD and high risk multigravida in third trimester scheduled on 12/29/2019 with Dr. Major. Pre op diagnosis: Supervision of other high risk pregnancies, unspecified trimester. Patient reports positive fetal movements, denies contractions, denies leakage of amniotic fluid.

## 2019-12-23 NOTE — OB PST NOTE - PMH
Normal vaginal delivery  07/ 21/2015 IUFD at 34 weeks- nuchal cord accident wt 4# +  05/25/2016  wt 6#11  Supervision of other high risk pregnancies, unspecified trimester
(2) difficulty swallowing liquids/puree diet/applesauce with medications

## 2019-12-23 NOTE — OB PST NOTE - NSHPREVIEWOFSYSTEMS_GEN_ALL_CORE
General: No fever, chills, sweating, anorexia, weight loss or weight gain,  fatigue    Skin: No rashes, itching, or dryness.    Breast: No tenderness, lumps, or nipple discharge      Ophthalmologic: No diplopia, photophobia, lacrimation, blurred Vision , or eye discharge    ENMT Symptoms: No hearing difficulty, ear pain, tinnitus, or vertigo. No sinus symptoms, nasal congestion, nasal   discharge, or nasal obstruction    Respiratory and Thorax: No wheezing, dyspnea, cough, hemoptysis, or pleuritic chest Pain     Cardiovascular: No chest pain, palpitations, dyspnea on exertion,   peripheral edema, or claudication    Gastrointestinal: No nausea, vomiting, diarrhea, constipation, change in bowel habits, flatulence, abdominal pain, or melena    Genitourinary/ Pelvis: No hematuria, renal colic, or flank pain.  No urine discoloration, incontinence, dysuria, or urinary hesitancy. Normal urinary frequency. No nocturia, abnormal vaginal bleeding, vaginal discharge, spotting, pelvic pain, or vaginal leakage    Musculoskeletal: No arthralgia, arthritis, joint swelling, muscle cramping, muscle weakness, neck pain, arm pain, or leg pain    Neurological: No transient paralysis, weakness, paresthesias, or seizures. No syncope, tremors, vertigo, loss of sensation, difficulty walking, loss of consciousness, hemiparesis, confusion, or facial palsy    Psychiatric: No suicidal ideation, depression, anxiety, insomnia, memory loss, paranoia, mood swings, agitation, hallucinations, or hyperactivity    Hematology: No gum bleeding, nose bleeding, or skin lumps    Lymphatic: No enlarged or tender lymph nodes. No extremity swelling    Endocrine: No heat or cold intolerance    Immunologic: No recurrent or persistent infections

## 2019-12-23 NOTE — OB PST NOTE - PROBLEM SELECTOR PLAN 1
Patient is scheduled for Cytotec induction of labor for history of IUFD and high risk multigravida in third trimester scheduled on 12/29/2019 with Dr. Major    Preop instructions Pre induction instructions provided. Pt stated understanding. Teach back method utilized.     CBC, RPR, UA, Type and screen sent

## 2019-12-23 NOTE — OB PST NOTE - NSANTHOSAYNRD_GEN_A_CORE
No. CLEMENTINE screening performed.  STOP BANG Legend: 0-2 = LOW Risk; 3-4 = INTERMEDIATE Risk; 5-8 = HIGH Risk

## 2019-12-23 NOTE — OB PST NOTE - ASSESSMENT
Patient is scheduled for Cytotec induction of labor for history of IUFD and high risk multigravida in third trimester scheduled on 12/29/2019 with Dr. Major. Pre op diagnosis: Supervision of other high risk pregnancies, unspecified trimester.

## 2019-12-24 ENCOUNTER — APPOINTMENT (OUTPATIENT)
Dept: OBGYN | Facility: CLINIC | Age: 24
End: 2019-12-24

## 2019-12-24 LAB — T PALLIDUM AB TITR SER: NEGATIVE — SIGNIFICANT CHANGE UP

## 2019-12-26 ENCOUNTER — OUTPATIENT (OUTPATIENT)
Dept: OUTPATIENT SERVICES | Facility: HOSPITAL | Age: 24
LOS: 1 days | End: 2019-12-26

## 2019-12-26 ENCOUNTER — APPOINTMENT (OUTPATIENT)
Dept: ANTEPARTUM | Facility: CLINIC | Age: 24
End: 2019-12-26
Payer: MEDICAID

## 2019-12-26 ENCOUNTER — APPOINTMENT (OUTPATIENT)
Dept: ANTEPARTUM | Facility: CLINIC | Age: 24
End: 2019-12-26

## 2019-12-26 ENCOUNTER — INPATIENT (INPATIENT)
Facility: HOSPITAL | Age: 24
LOS: 3 days | Discharge: HOME | End: 2019-12-30
Attending: OBSTETRICS & GYNECOLOGY | Admitting: OBSTETRICS & GYNECOLOGY
Payer: MEDICAID

## 2019-12-26 ENCOUNTER — ASOB RESULT (OUTPATIENT)
Age: 24
End: 2019-12-26

## 2019-12-26 ENCOUNTER — TRANSCRIPTION ENCOUNTER (OUTPATIENT)
Age: 24
End: 2019-12-26

## 2019-12-26 ENCOUNTER — APPOINTMENT (OUTPATIENT)
Dept: ANTEPARTUM | Facility: HOSPITAL | Age: 24
End: 2019-12-26
Payer: MEDICAID

## 2019-12-26 VITALS — TEMPERATURE: 98 F | HEART RATE: 80 BPM | DIASTOLIC BLOOD PRESSURE: 73 MMHG | SYSTOLIC BLOOD PRESSURE: 118 MMHG

## 2019-12-26 DIAGNOSIS — Z3A.00 WEEKS OF GESTATION OF PREGNANCY NOT SPECIFIED: ICD-10-CM

## 2019-12-26 DIAGNOSIS — O26.899 OTHER SPECIFIED PREGNANCY RELATED CONDITIONS, UNSPECIFIED TRIMESTER: ICD-10-CM

## 2019-12-26 DIAGNOSIS — O09.899 SUPERVISION OF OTHER HIGH RISK PREGNANCIES, UNSPECIFIED TRIMESTER: ICD-10-CM

## 2019-12-26 DIAGNOSIS — O28.9 UNSPECIFIED ABNORMAL FINDINGS ON ANTENATAL SCREENING OF MOTHER: ICD-10-CM

## 2019-12-26 LAB
BASOPHILS # BLD AUTO: 0.02 K/UL — SIGNIFICANT CHANGE UP (ref 0–0.2)
BASOPHILS NFR BLD AUTO: 0.3 % — SIGNIFICANT CHANGE UP (ref 0–2)
BLD GP AB SCN SERPL QL: NEGATIVE — SIGNIFICANT CHANGE UP
EOSINOPHIL # BLD AUTO: 0.07 K/UL — SIGNIFICANT CHANGE UP (ref 0–0.5)
EOSINOPHIL NFR BLD AUTO: 1 % — SIGNIFICANT CHANGE UP (ref 0–6)
HCT VFR BLD CALC: 37.4 % — SIGNIFICANT CHANGE UP (ref 34.5–45)
HGB BLD-MCNC: 11.9 G/DL — SIGNIFICANT CHANGE UP (ref 11.5–15.5)
IMM GRANULOCYTES NFR BLD AUTO: 0.7 % — SIGNIFICANT CHANGE UP (ref 0–1.5)
LYMPHOCYTES # BLD AUTO: 1.49 K/UL — SIGNIFICANT CHANGE UP (ref 1–3.3)
LYMPHOCYTES # BLD AUTO: 21.8 % — SIGNIFICANT CHANGE UP (ref 13–44)
MCHC RBC-ENTMCNC: 25.5 PG — LOW (ref 27–34)
MCHC RBC-ENTMCNC: 31.8 % — LOW (ref 32–36)
MCV RBC AUTO: 80.3 FL — SIGNIFICANT CHANGE UP (ref 80–100)
MONOCYTES # BLD AUTO: 0.52 K/UL — SIGNIFICANT CHANGE UP (ref 0–0.9)
MONOCYTES NFR BLD AUTO: 7.6 % — SIGNIFICANT CHANGE UP (ref 2–14)
NEUTROPHILS # BLD AUTO: 4.7 K/UL — SIGNIFICANT CHANGE UP (ref 1.8–7.4)
NEUTROPHILS NFR BLD AUTO: 68.6 % — SIGNIFICANT CHANGE UP (ref 43–77)
NRBC # FLD: 0 K/UL — SIGNIFICANT CHANGE UP (ref 0–0)
PLATELET # BLD AUTO: 210 K/UL — SIGNIFICANT CHANGE UP (ref 150–400)
PMV BLD: 11.7 FL — SIGNIFICANT CHANGE UP (ref 7–13)
RBC # BLD: 4.66 M/UL — SIGNIFICANT CHANGE UP (ref 3.8–5.2)
RBC # FLD: 14 % — SIGNIFICANT CHANGE UP (ref 10.3–14.5)
RH IG SCN BLD-IMP: POSITIVE — SIGNIFICANT CHANGE UP
WBC # BLD: 6.85 K/UL — SIGNIFICANT CHANGE UP (ref 3.8–10.5)
WBC # FLD AUTO: 6.85 K/UL — SIGNIFICANT CHANGE UP (ref 3.8–10.5)

## 2019-12-26 PROCEDURE — 76816 OB US FOLLOW-UP PER FETUS: CPT

## 2019-12-26 PROCEDURE — 76818 FETAL BIOPHYS PROFILE W/NST: CPT | Mod: 26

## 2019-12-26 RX ORDER — SODIUM CHLORIDE 9 MG/ML
1000 INJECTION, SOLUTION INTRAVENOUS
Refills: 0 | Status: DISCONTINUED | OUTPATIENT
Start: 2019-12-26 | End: 2019-12-27

## 2019-12-26 RX ORDER — BUTORPHANOL TARTRATE 2 MG/ML
2 INJECTION, SOLUTION INTRAMUSCULAR; INTRAVENOUS ONCE
Refills: 0 | Status: DISCONTINUED | OUTPATIENT
Start: 2019-12-26 | End: 2019-12-27

## 2019-12-26 RX ORDER — OXYTOCIN 10 UNIT/ML
333.33 VIAL (ML) INJECTION
Qty: 20 | Refills: 0 | Status: DISCONTINUED | OUTPATIENT
Start: 2019-12-26 | End: 2019-12-28

## 2019-12-26 RX ADMIN — SODIUM CHLORIDE 125 MILLILITER(S): 9 INJECTION, SOLUTION INTRAVENOUS at 17:09

## 2019-12-26 RX ADMIN — SODIUM CHLORIDE 125 MILLILITER(S): 9 INJECTION, SOLUTION INTRAVENOUS at 15:50

## 2019-12-26 NOTE — OB PROVIDER H&P - ASSESSMENT
22 Y/O  EDC 20 @38+6w sent from ATU for IOL for NRFHT and hx IUFD. + Intermittent irregular cramping. -LOF -VB. +FM. GBS Negative    Obhx:   Current AP course complicated by low ROSINA-A   IUFD nuchal cord @34w   2016  6#13 uncomplicated IOL for hx IUFD  GYNhx: Denies abnormal pap smears, STDs, fibroids, cysts  PMHx: Denies  PSHx: Denies  Social: Denies x3  Psych: Denies   Blood? AB+    NKDA  Meds: PNV    Abd soft, NT, gravid  Vital Signs Last 24 Hrs  T(C): 36.6 (26 Dec 2019 15:17), Max: 36.6 (26 Dec 2019 15:17)  T(F): 97.88 (26 Dec 2019 15:17), Max: 97.88 (26 Dec 2019 15:17)  HR: 80 (26 Dec 2019 15:17) (80 - 80)  BP: 118/73 (26 Dec 2019 15:17) (118/73 - 118/73)  EFM Cat I   [place] irr  ATU sono: cephalic presentation, CECELIA 14.7, anterior placenta, BPP 8/8  19 EFW~3082   SVE: 1-2/0/-3    A/P: 22 Y/O  EDC 20 @38+6w sent from ATU for IOL for NRFHT and hx IUFD.  -Admit to L&D  -Routine labs  -IVF  -Anesthesia consult  -PO cytotec as per Dr. Edinson garduno, NP

## 2019-12-26 NOTE — OB PROVIDER H&P - NS_OBGYNHISTORY_OBGYN_ALL_OB_FT
Obhx:   Current AP course complicated by low ROSINA-A  2015 IUFD nuchal cord @34w   2016  6#13 uncomplicated IOL for hx IUFD  GYNhx: Denies abnormal pap smears, STDs, fibroids, cysts  PMHx: Denies  PSHx: Denies  Social: Denies x3  Psych: Denies   Blood? AB+    NKDA  Meds: PNV

## 2019-12-26 NOTE — OB PROVIDER H&P - PMH
Normal vaginal delivery  07/ 21/2015 IUFD at 34 weeks- nuchal cord accident wt 4# +  05/25/2016  wt 6#11  Supervision of other high risk pregnancies, unspecified trimester

## 2019-12-26 NOTE — OB PROVIDER H&P - HISTORY OF PRESENT ILLNESS
24 Y/O  EDC 20 @38+6w sent from ATU for IOL for NRFHT and hx IUFD. + Intermittent irregular cramping. -LOF -VB. +FM. GBS Negative    Obhx:   Current AP course complicated by low ROSINA-A   IUFD nuchal cord @34w   2016  6#13 uncomplicated IOL for hx IUFD  GYNhx: Denies abnormal pap smears, STDs, fibroids, cysts  PMHx: Denies  PSHx: Denies  Social: Denies x3  Psych: Denies   Blood? AB+    NKDA  Meds: PNV    Abd soft, NT, gravid  Vital Signs Last 24 Hrs  T(C): 36.6 (26 Dec 2019 15:17), Max: 36.6 (26 Dec 2019 15:17)  T(F): 97.88 (26 Dec 2019 15:17), Max: 97.88 (26 Dec 2019 15:17)  HR: 80 (26 Dec 2019 15:17) (80 - 80)  BP: 118/73 (26 Dec 2019 15:17) (118/73 - 118/73)  EFM Cat I   [place] irr  ATU sono: cephalic presentation, CECELIA 14.7, anterior placenta, BPP 8/8  19 EFW~3082   SVE: 1-2/0/-3    A/P: 24 Y/O  EDC 20 @38+6w sent from ATU for IOL for NRFHT and hx IUFD.  -Admit to L&D  -Routine labs  -IVF  -Anesthesia consult  -PO cytotec as per Dr. Edinson garduno, NP

## 2019-12-26 NOTE — CHART NOTE - NSCHARTNOTEFT_GEN_A_CORE
S: Pt seen at bedside for pain.    No other complaints.    O:   T(C): 36.7 (12-26-19 @ 23:24), Max: 36.7 (12-26-19 @ 19:30)  HR: 78 (12-26-19 @ 23:33) (64 - 91)  BP: 106/51 (12-26-19 @ 21:38) (106/51 - 118/73)  RR: 16 (12-26-19 @ 15:52) (16 - 16)  SpO2: 100% (12-26-19 @ 23:28) (93% - 100%)    Gen: NAD  Abd: soft, non tender, gravid  SVE: 1-2/50/-3    Plan:   - stadol for pain  - continue w PO cytotec    D/w Dr. Edinson Vargas PGY3

## 2019-12-26 NOTE — OB RN PATIENT PROFILE - ALERT: PERTINENT HISTORY
Fetal Non-Stress Test (NST)/20 Week Level II Sonogram/Ultra Screen at 12 Weeks/1st Trimester Sonogram

## 2019-12-26 NOTE — OB PROVIDER IHI INDUCTION/AUGMENTATION NOTE - NS_CHECKALL_OBGYN_ALL_OB
Induction / Augmentation was discussed/Order was written/FHR was reviewed/H&P was completed/Contractions pattern was reviewed

## 2019-12-27 ENCOUNTER — RESULT REVIEW (OUTPATIENT)
Age: 24
End: 2019-12-27

## 2019-12-27 ENCOUNTER — TRANSCRIPTION ENCOUNTER (OUTPATIENT)
Age: 24
End: 2019-12-27

## 2019-12-27 LAB — T PALLIDUM AB TITR SER: NEGATIVE — SIGNIFICANT CHANGE UP

## 2019-12-27 PROCEDURE — 59510 CESAREAN DELIVERY: CPT | Mod: U9,UB,GC

## 2019-12-27 PROCEDURE — 74018 RADEX ABDOMEN 1 VIEW: CPT | Mod: 26

## 2019-12-27 PROCEDURE — 88307 TISSUE EXAM BY PATHOLOGIST: CPT | Mod: 26

## 2019-12-27 RX ORDER — LANOLIN
1 OINTMENT (GRAM) TOPICAL EVERY 6 HOURS
Refills: 0 | Status: DISCONTINUED | OUTPATIENT
Start: 2019-12-27 | End: 2019-12-30

## 2019-12-27 RX ORDER — GLYCERIN ADULT
1 SUPPOSITORY, RECTAL RECTAL AT BEDTIME
Refills: 0 | Status: DISCONTINUED | OUTPATIENT
Start: 2019-12-27 | End: 2019-12-30

## 2019-12-27 RX ORDER — HEPARIN SODIUM 5000 [USP'U]/ML
5000 INJECTION INTRAVENOUS; SUBCUTANEOUS EVERY 12 HOURS
Refills: 0 | Status: DISCONTINUED | OUTPATIENT
Start: 2019-12-27 | End: 2019-12-30

## 2019-12-27 RX ORDER — KETOROLAC TROMETHAMINE 30 MG/ML
30 SYRINGE (ML) INJECTION EVERY 6 HOURS
Refills: 0 | Status: DISCONTINUED | OUTPATIENT
Start: 2019-12-27 | End: 2019-12-28

## 2019-12-27 RX ORDER — OXYTOCIN 10 UNIT/ML
333.33 VIAL (ML) INJECTION
Qty: 20 | Refills: 0 | Status: DISCONTINUED | OUTPATIENT
Start: 2019-12-27 | End: 2019-12-27

## 2019-12-27 RX ORDER — MAGNESIUM HYDROXIDE 400 MG/1
30 TABLET, CHEWABLE ORAL
Refills: 0 | Status: DISCONTINUED | OUTPATIENT
Start: 2019-12-27 | End: 2019-12-30

## 2019-12-27 RX ORDER — ACETAMINOPHEN 500 MG
975 TABLET ORAL
Refills: 0 | Status: DISCONTINUED | OUTPATIENT
Start: 2019-12-27 | End: 2019-12-30

## 2019-12-27 RX ORDER — OXYCODONE HYDROCHLORIDE 5 MG/1
10 TABLET ORAL
Refills: 0 | Status: DISCONTINUED | OUTPATIENT
Start: 2019-12-27 | End: 2019-12-27

## 2019-12-27 RX ORDER — ONDANSETRON 8 MG/1
4 TABLET, FILM COATED ORAL EVERY 6 HOURS
Refills: 0 | Status: DISCONTINUED | OUTPATIENT
Start: 2019-12-27 | End: 2019-12-28

## 2019-12-27 RX ORDER — OXYCODONE HYDROCHLORIDE 5 MG/1
5 TABLET ORAL ONCE
Refills: 0 | Status: DISCONTINUED | OUTPATIENT
Start: 2019-12-27 | End: 2019-12-30

## 2019-12-27 RX ORDER — OXYCODONE HYDROCHLORIDE 5 MG/1
5 TABLET ORAL
Refills: 0 | Status: DISCONTINUED | OUTPATIENT
Start: 2019-12-27 | End: 2019-12-27

## 2019-12-27 RX ORDER — FOLIC ACID 0.8 MG
1 TABLET ORAL DAILY
Refills: 0 | Status: DISCONTINUED | OUTPATIENT
Start: 2019-12-27 | End: 2019-12-30

## 2019-12-27 RX ORDER — OXYCODONE HYDROCHLORIDE 5 MG/1
5 TABLET ORAL
Refills: 0 | Status: DISCONTINUED | OUTPATIENT
Start: 2019-12-27 | End: 2019-12-30

## 2019-12-27 RX ORDER — NALOXONE HYDROCHLORIDE 4 MG/.1ML
0.1 SPRAY NASAL
Refills: 0 | Status: DISCONTINUED | OUTPATIENT
Start: 2019-12-27 | End: 2019-12-28

## 2019-12-27 RX ORDER — DIPHENHYDRAMINE HCL 50 MG
25 CAPSULE ORAL EVERY 6 HOURS
Refills: 0 | Status: DISCONTINUED | OUTPATIENT
Start: 2019-12-27 | End: 2019-12-30

## 2019-12-27 RX ORDER — TETANUS TOXOID, REDUCED DIPHTHERIA TOXOID AND ACELLULAR PERTUSSIS VACCINE, ADSORBED 5; 2.5; 8; 8; 2.5 [IU]/.5ML; [IU]/.5ML; UG/.5ML; UG/.5ML; UG/.5ML
0.5 SUSPENSION INTRAMUSCULAR ONCE
Refills: 0 | Status: DISCONTINUED | OUTPATIENT
Start: 2019-12-27 | End: 2019-12-30

## 2019-12-27 RX ORDER — IBUPROFEN 200 MG
600 TABLET ORAL EVERY 6 HOURS
Refills: 0 | Status: COMPLETED | OUTPATIENT
Start: 2019-12-27 | End: 2020-11-24

## 2019-12-27 RX ORDER — HYDROMORPHONE HYDROCHLORIDE 2 MG/ML
1 INJECTION INTRAMUSCULAR; INTRAVENOUS; SUBCUTANEOUS
Refills: 0 | Status: DISCONTINUED | OUTPATIENT
Start: 2019-12-27 | End: 2019-12-27

## 2019-12-27 RX ORDER — SIMETHICONE 80 MG/1
80 TABLET, CHEWABLE ORAL EVERY 4 HOURS
Refills: 0 | Status: DISCONTINUED | OUTPATIENT
Start: 2019-12-27 | End: 2019-12-30

## 2019-12-27 RX ORDER — BUTORPHANOL TARTRATE 2 MG/ML
0.25 INJECTION, SOLUTION INTRAMUSCULAR; INTRAVENOUS EVERY 6 HOURS
Refills: 0 | Status: DISCONTINUED | OUTPATIENT
Start: 2019-12-27 | End: 2019-12-28

## 2019-12-27 RX ORDER — OXYTOCIN 10 UNIT/ML
333.33 VIAL (ML) INJECTION
Qty: 20 | Refills: 0 | Status: DISCONTINUED | OUTPATIENT
Start: 2019-12-27 | End: 2019-12-28

## 2019-12-27 RX ORDER — SODIUM CHLORIDE 9 MG/ML
1000 INJECTION, SOLUTION INTRAVENOUS
Refills: 0 | Status: DISCONTINUED | OUTPATIENT
Start: 2019-12-27 | End: 2019-12-28

## 2019-12-27 RX ADMIN — Medication 975 MILLIGRAM(S): at 20:33

## 2019-12-27 RX ADMIN — SIMETHICONE 80 MILLIGRAM(S): 80 TABLET, CHEWABLE ORAL at 20:32

## 2019-12-27 RX ADMIN — HEPARIN SODIUM 5000 UNIT(S): 5000 INJECTION INTRAVENOUS; SUBCUTANEOUS at 18:25

## 2019-12-27 RX ADMIN — Medication 30 MILLIGRAM(S): at 18:25

## 2019-12-27 RX ADMIN — BUTORPHANOL TARTRATE 2 MILLIGRAM(S): 2 INJECTION, SOLUTION INTRAMUSCULAR; INTRAVENOUS at 00:09

## 2019-12-27 RX ADMIN — BUTORPHANOL TARTRATE 2 MILLIGRAM(S): 2 INJECTION, SOLUTION INTRAMUSCULAR; INTRAVENOUS at 00:39

## 2019-12-27 RX ADMIN — Medication 25 MILLIGRAM(S): at 23:11

## 2019-12-27 NOTE — DISCHARGE NOTE OB - CARE PROVIDER_API CALL
Bren Major (MD)  Obstetrics and Gynecology  [facility] [place] [address]  Phone: [phone]  Fax: [phone]  Follow Up Time:

## 2019-12-27 NOTE — DISCHARGE NOTE OB - HOSPITAL COURSE
Pt admitted for induction of labor due to Category FHT at term.  At 4cm, an IUPC was placed.  FHT soon after revealed deep variable decelerations and a bradycardia requiring an emergency C/s for delivery. Pt delivered a viable female infant. Uncomplicated postop course

## 2019-12-27 NOTE — CHART NOTE - NSCHARTNOTEFT_GEN_A_CORE
PA Note    patient seen & examined for early & variable decelerations. Feeling rectal pressure    VS  T(C): 36.5 (12-27-19 @ 04:33)  HR: 79 (12-27-19 @ 10:16)  BP: 129/77 (12-27-19 @ 10:07)  RR: 16 (12-26-19 @ 15:52)  SpO2: 100% (12-27-19 @ 10:16)    /min-mod victor m/+accels/early & variable decelerations   [place] q 2-3min  4/80/-3 ISE placed for continuous monitoring     cont efm/toco  resuscitative measures in place  Dr Major aware & en route to labor floor  cont to monitor tracing   brandy parson

## 2019-12-27 NOTE — CHART NOTE - NSCHARTNOTEFT_GEN_A_CORE
r4 ob note    pt examined for pain    SVE: 3/70/-1  EFM:125/mod victor m/+acc/recurrent early dec  [place]: q2min    c/w expectant management  Alicia, PGY-4

## 2019-12-27 NOTE — OB PROVIDER DELIVERY SUMMARY - NSPROVIDERDELIVERYNOTE_OBGYN_ALL_OB_FT
- grossly normal uterus, tubes, and ovaries  - viable female infant, cephalic presentation, apgars 9/9  -   - IVF 2200  - UOP 50

## 2019-12-27 NOTE — CHART NOTE - NSCHARTNOTEFT_GEN_A_CORE
R2 Progress Note    Patient requesting an epidural.   SVE 2.5/70/-3  [place]: q2-3min   /mod victor m/accels+/no decels    A/P:  at 39+0 admitted for IOL due to h/o IUFD.   - Epidural for pain   - C/w PO cytotec, consider pitocin augmentation if ctx space out  - Fetus: cat I, continous EFM     Esra Demirel PGY2  d/w Dr. Neves

## 2019-12-27 NOTE — OB NEONATOLOGY/PEDIATRICIAN DELIVERY SUMMARY - NSPEDSNEONOTESA_OBGYN_ALL_OB_FT
NICU called to delivery for  code 100 for cat 2 FH tracing, fetal bradycardia, occult cord after IOL for past history of fetal demise. Baby boy/girl born at _ wks via /CS to a _ y/o G_ _ blood type ___ mother. Maternal history of _. Prenatal history of _ . No significant maternal or prenatal history. PNL nr/immune/-, GBS +/- on __. ROM at __ with clear/meconium fluids. Baby emerged vigorous, crying, was w/d/s/s, coad clamping delayed _min. HR>100, normal respiratory effort. APGARS of . Mom would like to breast/bottle feed and consents/declines Hep B.  Consents/Declines circ.  EOS ___.    BW: 2810g  :19  TOB: 11:21  DOD: 19 NICU called to delivery for  code 100 for category 2 FH tracing, fetal bradycardia, occult cord after IOL for past history of fetal demise. Baby girl born at 39wks via crash CS to a 24y/o  blood type AB+ mother. Maternal history of 34wk IUFD. No significant prenatal history. PNL nr/immune/-, GBS- on 12/15. SROM at 08:30 on 19 with clear fluids. Baby emerged vigorous, crying, was w/d/s/s, coard clamping delayed 45sec. HR>100, normal respiratory effort. APGARS of 9/9 for color. Mom would like to breast feed and consents Hep B. EOS 0.12. Delivery attended by NICU attending Dr. Saleem.     BW: 2810g  :19  TOB: 11:21  DOD: 19 NICU called to delivery for  code 100 for category 2 FH tracing, fetal bradycardia, occult cord after IOL for past history of fetal demise. Baby girl born at 39wks via crash CS to a 24y/o  blood type AB+ mother. Maternal history of 34wk IUFD. No significant prenatal history. PNL nr/immune/-, GBS- on 12/15. SROM at 08:30 on 19 with clear fluids. Baby emerged vigorous, crying, was w/d/s/s, cord clamping delayed 45sec. HR>100, normal respiratory effort. dried and stimulated, APGARS of 9/9 for color. Mom would like to breast feed and consents Hep B. EOS 0.12. Delivery attended by NICU attending Dr. Saleem and mary ann [id number]-BC    BW: 2810g  :19  TOB: 11:21  DOD: 19

## 2019-12-27 NOTE — DISCHARGE NOTE OB - PATIENT PORTAL LINK FT
You can access the FollowMyHealth Patient Portal offered by [facility] by registering at the following website: [email or web address]. By joining [employer]’s FollowMyHealth portal, you will also be able to view your health information using other applications (apps) compatible with our system.

## 2019-12-27 NOTE — CHART NOTE - NSCHARTNOTEFT_GEN_A_CORE
OB Attg--Note for encounter from appx 11am to 1220pm    Pt comfortable with epidural, feeling mild pressure  , pos accels, moderate variability, early decels,  toco q 2-4min  VE 4/90/-3  IUPC placed without difficulty and no bleeding noted    2 Appx 2 min after IUPC placed, deep variable decels noted  VE 6/90, minimal bleeding noted  lateral positioning, then fowlers with O2 and IVF with some recovery to baseline after terbutaline given  A second dose of terbutaline given to allow recovery from variable decelerations  VE 9/100 and attempt made by Dr Mei to reduce anterior lip of cervix and allow pt to push.  This attempt unsuccessful.  FHT in 70-80s for 5-6min and decision made to transfer to main OR for emergency primary C/S.  Verbal and abbreviated consent obtained from patient   code 100 called

## 2019-12-27 NOTE — OB RN DELIVERY SUMMARY - NS_SEPSISRSKCALC_OBGYN_ALL_OB_FT
EOS calculated successfully. EOS Risk Factor: 0.5/1000 live births ([facility] national incidence); GA=39w;Temp=98.06; ROM=2.85; GBS='Negative'; Antibiotics='No antibiotics or any antibiotics < 2 hrs prior to birth'

## 2019-12-27 NOTE — DISCHARGE NOTE OB - MEDICATION SUMMARY - MEDICATIONS TO TAKE
I will START or STAY ON the medications listed below when I get home from the hospital:    Prenatal 1 oral capsule  -- Indication: For Postpartum

## 2019-12-27 NOTE — DISCHARGE NOTE OB - CARE PLAN
Principal Discharge DX:	 delivery delivered  Goal:	Recovery  Assessment and plan of treatment:	No heavy lifting for 6wks

## 2019-12-28 LAB
BASOPHILS # BLD AUTO: 0.02 K/UL — SIGNIFICANT CHANGE UP (ref 0–0.2)
BASOPHILS NFR BLD AUTO: 0.2 % — SIGNIFICANT CHANGE UP (ref 0–2)
EOSINOPHIL # BLD AUTO: 0.01 K/UL — SIGNIFICANT CHANGE UP (ref 0–0.5)
EOSINOPHIL NFR BLD AUTO: 0.1 % — SIGNIFICANT CHANGE UP (ref 0–6)
HCT VFR BLD CALC: 32.7 % — LOW (ref 34.5–45)
HGB BLD-MCNC: 10.5 G/DL — LOW (ref 11.5–15.5)
IMM GRANULOCYTES NFR BLD AUTO: 0.6 % — SIGNIFICANT CHANGE UP (ref 0–1.5)
LYMPHOCYTES # BLD AUTO: 1.4 K/UL — SIGNIFICANT CHANGE UP (ref 1–3.3)
LYMPHOCYTES # BLD AUTO: 11.1 % — LOW (ref 13–44)
MCHC RBC-ENTMCNC: 25.4 PG — LOW (ref 27–34)
MCHC RBC-ENTMCNC: 32.1 % — SIGNIFICANT CHANGE UP (ref 32–36)
MCV RBC AUTO: 79 FL — LOW (ref 80–100)
MONOCYTES # BLD AUTO: 0.53 K/UL — SIGNIFICANT CHANGE UP (ref 0–0.9)
MONOCYTES NFR BLD AUTO: 4.2 % — SIGNIFICANT CHANGE UP (ref 2–14)
NEUTROPHILS # BLD AUTO: 10.62 K/UL — HIGH (ref 1.8–7.4)
NEUTROPHILS NFR BLD AUTO: 83.8 % — HIGH (ref 43–77)
NRBC # FLD: 0 K/UL — SIGNIFICANT CHANGE UP (ref 0–0)
PLATELET # BLD AUTO: 188 K/UL — SIGNIFICANT CHANGE UP (ref 150–400)
PMV BLD: 11.1 FL — SIGNIFICANT CHANGE UP (ref 7–13)
RBC # BLD: 4.14 M/UL — SIGNIFICANT CHANGE UP (ref 3.8–5.2)
RBC # FLD: 14.3 % — SIGNIFICANT CHANGE UP (ref 10.3–14.5)
WBC # BLD: 12.65 K/UL — HIGH (ref 3.8–10.5)
WBC # FLD AUTO: 12.65 K/UL — HIGH (ref 3.8–10.5)

## 2019-12-28 RX ORDER — IBUPROFEN 200 MG
600 TABLET ORAL EVERY 6 HOURS
Refills: 0 | Status: DISCONTINUED | OUTPATIENT
Start: 2019-12-28 | End: 2019-12-30

## 2019-12-28 RX ADMIN — MAGNESIUM HYDROXIDE 30 MILLILITER(S): 400 TABLET, CHEWABLE ORAL at 21:45

## 2019-12-28 RX ADMIN — Medication 975 MILLIGRAM(S): at 18:38

## 2019-12-28 RX ADMIN — Medication 975 MILLIGRAM(S): at 09:21

## 2019-12-28 RX ADMIN — Medication 975 MILLIGRAM(S): at 18:10

## 2019-12-28 RX ADMIN — HEPARIN SODIUM 5000 UNIT(S): 5000 INJECTION INTRAVENOUS; SUBCUTANEOUS at 18:10

## 2019-12-28 RX ADMIN — Medication 600 MILLIGRAM(S): at 15:15

## 2019-12-28 RX ADMIN — Medication 30 MILLIGRAM(S): at 06:18

## 2019-12-28 RX ADMIN — Medication 30 MILLIGRAM(S): at 01:19

## 2019-12-28 RX ADMIN — Medication 1 MILLIGRAM(S): at 14:32

## 2019-12-28 RX ADMIN — Medication 30 MILLIGRAM(S): at 01:49

## 2019-12-28 RX ADMIN — Medication 975 MILLIGRAM(S): at 10:00

## 2019-12-28 RX ADMIN — Medication 600 MILLIGRAM(S): at 21:42

## 2019-12-28 RX ADMIN — HEPARIN SODIUM 5000 UNIT(S): 5000 INJECTION INTRAVENOUS; SUBCUTANEOUS at 05:52

## 2019-12-28 RX ADMIN — Medication 600 MILLIGRAM(S): at 22:26

## 2019-12-28 RX ADMIN — Medication 1 TABLET(S): at 14:32

## 2019-12-28 RX ADMIN — Medication 600 MILLIGRAM(S): at 14:31

## 2019-12-28 RX ADMIN — SIMETHICONE 80 MILLIGRAM(S): 80 TABLET, CHEWABLE ORAL at 21:44

## 2019-12-28 NOTE — PROGRESS NOTE ADULT - SUBJECTIVE AND OBJECTIVE BOX
Postop Day  __1_ s/p   C- Section    THERAPY:    [  ] Spinal morphine ____ mg  [x  ] Epidural morphine _3__ mg  [  ] IV PCA Hydromophone 1 mg/ml    OBJECTIVE:    Sedation Score:	  [x ] Alert	    [  ] Drowsy        [  ] Arousable	[  ] Asleep	[  ] Unresponsive    Side Effects:	  [ x ] None	     [  ] Nausea        [  ] Pruritus        [  ] Weaknes   [  ] Numbness   [  ] Other:        ASSESSMENT/ PLAN  [  ] Continue   [ x ] Discpntinue   [ x ]Documentation and Verification of current medications     Comments:

## 2019-12-28 NOTE — PROGRESS NOTE ADULT - SUBJECTIVE AND OBJECTIVE BOX
OB Progress Note:  Delivery, POD#1    S: 25yo POD#1 s/p LTCS . Her pain is well controlled. She is tolerating a regular diet and passing flatus. Denies N/V. Denies CP/SOB/lightheadedness/dizziness.   She is ambulating without difficulty.   Voiding spontaneously.     O:   Vital Signs Last 24 Hrs  T(C): 36.8 (28 Dec 2019 05:58), Max: 37.8 (27 Dec 2019 22:03)  T(F): 98.3 (28 Dec 2019 05:58), Max: 100 (27 Dec 2019 22:03)  HR: 103 (28 Dec 2019 02:05) (63 - 122)  BP: 114/75 (28 Dec 2019 02:05) (91/48 - 145/58)  BP(mean): 72 (27 Dec 2019 14:45) (57 - 95)  RR: 17 (28 Dec 2019 02:05) (16 - 18)  SpO2: 99% (28 Dec 2019 02:05) (98% - 100%)    Labs:  Blood type: AB Positive  Rubella IgG: RPR: Negative                          10.5<L>   12.65<H> >-----------< 188    (  @ 06:43 )             32.7<L>                        11.9   6.85 >-----------< 210    (  @ 15:41 )             37.4                  PE:  General: NAD  Abdomen: Mildly distended, appropriately tender, incision c/d/i dressing removed.  Extremities: No erythema, no pitting edema

## 2019-12-28 NOTE — PROGRESS NOTE ADULT - PROBLEM SELECTOR PLAN 1
- Continue regular diet.  - Increase ambulation.  - Continue motrin, tylenol, oxycodone PRN for pain control.    - F/u AM CBC

## 2019-12-29 RX ADMIN — HEPARIN SODIUM 5000 UNIT(S): 5000 INJECTION INTRAVENOUS; SUBCUTANEOUS at 17:22

## 2019-12-29 RX ADMIN — Medication 600 MILLIGRAM(S): at 12:00

## 2019-12-29 RX ADMIN — Medication 600 MILLIGRAM(S): at 11:21

## 2019-12-29 RX ADMIN — Medication 975 MILLIGRAM(S): at 09:56

## 2019-12-29 RX ADMIN — Medication 975 MILLIGRAM(S): at 22:53

## 2019-12-29 RX ADMIN — Medication 975 MILLIGRAM(S): at 16:27

## 2019-12-29 RX ADMIN — Medication 975 MILLIGRAM(S): at 10:30

## 2019-12-29 RX ADMIN — Medication 1 TABLET(S): at 11:21

## 2019-12-29 RX ADMIN — Medication 1 MILLIGRAM(S): at 11:21

## 2019-12-29 RX ADMIN — Medication 975 MILLIGRAM(S): at 22:23

## 2019-12-29 RX ADMIN — Medication 600 MILLIGRAM(S): at 17:22

## 2019-12-29 RX ADMIN — Medication 600 MILLIGRAM(S): at 06:11

## 2019-12-29 RX ADMIN — Medication 600 MILLIGRAM(S): at 06:55

## 2019-12-29 RX ADMIN — Medication 975 MILLIGRAM(S): at 17:00

## 2019-12-29 RX ADMIN — HEPARIN SODIUM 5000 UNIT(S): 5000 INJECTION INTRAVENOUS; SUBCUTANEOUS at 06:10

## 2019-12-29 RX ADMIN — Medication 600 MILLIGRAM(S): at 18:00

## 2019-12-29 NOTE — PROGRESS NOTE ADULT - SUBJECTIVE AND OBJECTIVE BOX
Postpartum Note,  Section  She is a  24y woman who is now post-operative day:     Subjective:  The patient feels well.  She is ambulating.   She is tolerating regular diet.  She denies nausea and vomiting.  She is voiding.  Her pain is controlled.  She reports normal postpartum bleeding.  She is breastfeeding.  She is formula feeding.    Physical exam:    Vital Signs Last 24 Hrs  T(C): 36.6 (29 Dec 2019 06:20), Max: 36.9 (28 Dec 2019 13:45)  T(F): 97.8 (29 Dec 2019 06:20), Max: 98.4 (28 Dec 2019 13:45)  HR: 103 (29 Dec 2019 06:20) (101 - 103)  BP: 121/83 (29 Dec 2019 06:20) (114/65 - 121/83)  BP(mean): --  RR: 16 (29 Dec 2019 06:20) (16 - 17)  SpO2: 100% (29 Dec 2019 06:20) (100% - 100%)    Gen: NAD  Breast: Soft, nontender, not engorged.  Abdomen: Soft, nontender, no distension , firm uterine fundus at umbilicus.  Incision: Clean, dry, and intact with steri strips  Pelvic: Normal lochia noted  Ext: No calf tenderness    LABS:                        10.5   12.65 )-----------( 188      ( 28 Dec 2019 06:43 )             32.7                   Allergies    No Known Allergies    Intolerances      MEDICATIONS  (STANDING):  acetaminophen   Tablet .. 975 milliGRAM(s) Oral <User Schedule>  diphtheria/tetanus/pertussis (acellular) Vaccine (ADAcel) 0.5 milliLiter(s) IntraMuscular once  folic acid 1 milliGRAM(s) Oral daily  heparin  Injectable 5000 Unit(s) SubCutaneous every 12 hours  ibuprofen  Tablet. 600 milliGRAM(s) Oral every 6 hours  prenatal multivitamin 1 Tablet(s) Oral daily    MEDICATIONS  (PRN):  diphenhydrAMINE 25 milliGRAM(s) Oral every 6 hours PRN Itching  glycerin Suppository - Adult 1 Suppository(s) Rectal at bedtime PRN Constipation  lanolin Ointment 1 Application(s) Topical every 6 hours PRN Sore Nipples  magnesium hydroxide Suspension 30 milliLiter(s) Oral two times a day PRN Constipation  oxyCODONE    IR 5 milliGRAM(s) Oral every 3 hours PRN Moderate to Severe Pain (4-10)  oxyCODONE    IR 5 milliGRAM(s) Oral once PRN Moderate to Severe Pain (4-10)  simethicone 80 milliGRAM(s) Chew every 4 hours PRN Gas

## 2019-12-30 VITALS
RESPIRATION RATE: 18 BRPM | HEART RATE: 84 BPM | TEMPERATURE: 98 F | OXYGEN SATURATION: 99 % | DIASTOLIC BLOOD PRESSURE: 75 MMHG | SYSTOLIC BLOOD PRESSURE: 113 MMHG

## 2019-12-30 RX ADMIN — Medication 600 MILLIGRAM(S): at 06:06

## 2019-12-30 RX ADMIN — HEPARIN SODIUM 5000 UNIT(S): 5000 INJECTION INTRAVENOUS; SUBCUTANEOUS at 06:06

## 2019-12-30 RX ADMIN — Medication 600 MILLIGRAM(S): at 06:36

## 2019-12-30 NOTE — PROGRESS NOTE ADULT - SUBJECTIVE AND OBJECTIVE BOX
SUBJECTIVE:    Pain: well controlled  Complaints: none    MILESTONES:    Alert and oriented x 3  [x  ]  Out of bed/ ambulating. [x  ]  Flatus: [ x ]  Postive [  ] Negative   Bowel movement  [ x ] Positive [  ] Negative   Voiding [ x ] Due to void [  ]   Diet: Regular [x  ]  Clears [  ]  NPO [  ]  Infant feeding:  Breast [x  ]   Bottle [  ]  Both [  ]  Feeding related inssues and/or concerns: none      OBJECTIVE:  T(C): 36.7 (19 @ 06:45), Max: 36.7 (19 @ 06:45)  HR: 84 (19 @ 06:45) (84 - 103)  BP: 113/75 (19 @ 06:45) (113/75 - 114/71)  RR: 18 (19 @ 06:45) (17 - 18)  SpO2: 99% (19 @ 06:45) (99% - 100%)  Wt(kg): --    MEDICATIONS  (STANDING):  acetaminophen   Tablet .. 975 milliGRAM(s) Oral <User Schedule>  diphtheria/tetanus/pertussis (acellular) Vaccine (ADAcel) 0.5 milliLiter(s) IntraMuscular once  folic acid 1 milliGRAM(s) Oral daily  heparin  Injectable 5000 Unit(s) SubCutaneous every 12 hours  ibuprofen  Tablet. 600 milliGRAM(s) Oral every 6 hours  prenatal multivitamin 1 Tablet(s) Oral daily    MEDICATIONS  (PRN):  diphenhydrAMINE 25 milliGRAM(s) Oral every 6 hours PRN Itching  glycerin Suppository - Adult 1 Suppository(s) Rectal at bedtime PRN Constipation  lanolin Ointment 1 Application(s) Topical every 6 hours PRN Sore Nipples  magnesium hydroxide Suspension 30 milliLiter(s) Oral two times a day PRN Constipation  oxyCODONE    IR 5 milliGRAM(s) Oral every 3 hours PRN Moderate to Severe Pain (4-10)  oxyCODONE    IR 5 milliGRAM(s) Oral once PRN Moderate to Severe Pain (4-10)  simethicone 80 milliGRAM(s) Chew every 4 hours PRN Gas    ASSESSMENT:  24y  y/o G 3  P  3  PO Day#   2     Delivery: Primary [ x ]    Repeat [  ]                                       Indication of procedure: abnormal FHR  Condition: Stable  Past Medical History significant for: HPI: h/o Fetal demise  Current Issues: none  Heart:     RRR                         Lungs: clear  Breasts:  Soft [ x ]   Engorged [  ]  Abdomen: Soft [ x ] , distended [  ] nontender [ x ]   Bowel sounds :  Present [ x ]  Absent [  ]   Fundus firm [ x ]  Boggy [  ]  Abdominal incision: Clean, dry and intact [x  ]  Staples [  ] Steri Strips [x  ] Dermabond [  ] Sutures   Patient wearing abdominal binder for support.  Vaginal: Lochia:  Heavy [  ]  Moderate [  ]   Scant [ x ]  Extremities: Edema [ 0 ] negative Poncho's Sign [ x ] Nontender Deshawn  [ x ] Positive pedal pulses [x  ]  Other relevant physical exam findings: none      PLAN:  Plan: Increase ambulation, analgesia PRN and pain medication protocol standing oxycodone, ibuprofen and acetaminophen.  Diet: Regular diet  Continue routine post-operative and postpartum care.   Discharge Planning [ x ]

## 2020-01-03 DIAGNOSIS — O36.8190 DECREASED FETAL MOVEMENTS, UNSPECIFIED TRIMESTER, NOT APPLICABLE OR UNSPECIFIED: ICD-10-CM

## 2020-01-07 DIAGNOSIS — Z3A.37 37 WEEKS GESTATION OF PREGNANCY: ICD-10-CM

## 2020-01-07 DIAGNOSIS — O28.1 ABNORMAL BIOCHEMICAL FINDING ON ANTENATAL SCREENING OF MOTHER: ICD-10-CM

## 2020-01-07 DIAGNOSIS — Z3A.36 36 WEEKS GESTATION OF PREGNANCY: ICD-10-CM

## 2020-01-07 DIAGNOSIS — O09.293 SUPERVISION OF PREGNANCY WITH OTHER POOR REPRODUCTIVE OR OBSTETRIC HISTORY, THIRD TRIMESTER: ICD-10-CM

## 2020-01-07 DIAGNOSIS — O32.9XX0 MATERNAL CARE FOR MALPRESENTATION OF FETUS, UNSPECIFIED, NOT APPLICABLE OR UNSPECIFIED: ICD-10-CM

## 2020-01-09 DIAGNOSIS — O28.1 ABNORMAL BIOCHEMICAL FINDING ON ANTENATAL SCREENING OF MOTHER: ICD-10-CM

## 2020-01-09 DIAGNOSIS — O09.293 SUPERVISION OF PREGNANCY WITH OTHER POOR REPRODUCTIVE OR OBSTETRIC HISTORY, THIRD TRIMESTER: ICD-10-CM

## 2020-01-09 DIAGNOSIS — Z3A.38 38 WEEKS GESTATION OF PREGNANCY: ICD-10-CM

## 2020-01-13 DIAGNOSIS — O36.8330 MATERNAL CARE FOR ABNORMALITIES OF THE FETAL HEART RATE OR RHYTHM, THIRD TRIMESTER, NOT APPLICABLE OR UNSPECIFIED: ICD-10-CM

## 2020-01-13 DIAGNOSIS — O28.1 ABNORMAL BIOCHEMICAL FINDING ON ANTENATAL SCREENING OF MOTHER: ICD-10-CM

## 2020-01-13 DIAGNOSIS — O09.293 SUPERVISION OF PREGNANCY WITH OTHER POOR REPRODUCTIVE OR OBSTETRIC HISTORY, THIRD TRIMESTER: ICD-10-CM

## 2020-01-17 LAB — SURGICAL PATHOLOGY STUDY: SIGNIFICANT CHANGE UP

## 2020-01-23 LAB
ABO + RH PNL BLD: NORMAL
AR GENE MUT ANL BLD/T: NEGATIVE
AT III PPP CHRO-ACNC: 93 %
B19V IGG SER QL IA: 0.1 INDEX
B19V IGG+IGM SER-IMP: NEGATIVE
B19V IGG+IGM SER-IMP: NORMAL
B19V IGM FLD-ACNC: 0.2
B19V IGM SER-ACNC: NEGATIVE
B2 GLYCOPROT1 IGA SERPL IA-ACNC: 8.5 SAU
BASOPHILS # BLD AUTO: 0.02 K/UL
BASOPHILS # BLD AUTO: 0.04 K/UL
BASOPHILS NFR BLD AUTO: 0.2 %
BASOPHILS NFR BLD AUTO: 0.5 %
BLD GP AB SCN SERPL QL: NORMAL
CFTR MUT TESTED BLD/T: NEGATIVE
CMV IGG SERPL QL: 7.8 U/ML
CMV IGG SERPL-IMP: POSITIVE
CMV IGM SERPL QL: <8 AU/ML
CMV IGM SERPL QL: NEGATIVE
CONFIRM: 25 SEC
DNA PLOIDY SPEC FC-IMP: NORMAL
DRVVT IMM 1:2 NP PPP: NORMAL
DRVVT SCREEN TO CONFIRM RATIO: 1.01 RATIO
EOSINOPHIL # BLD AUTO: 0.11 K/UL
EOSINOPHIL # BLD AUTO: 0.15 K/UL
EOSINOPHIL NFR BLD AUTO: 1.3 %
EOSINOPHIL NFR BLD AUTO: 1.7 %
FMR1 GENE MUT ANL BLD/T: NORMAL
GLUCOSE 1H P 50 G GLC PO SERPL-MCNC: 116 MG/DL
GP B STREP DNA SPEC QL NAA+PROBE: NORMAL
GP B STREP DNA SPEC QL NAA+PROBE: NOT DETECTED
HBV SURFACE AG SER QL: NONREACTIVE
HCT VFR BLD CALC: 35.9 %
HCT VFR BLD CALC: 37.5 %
HCV AB SER QL: NONREACTIVE
HCV S/CO RATIO: 0.21 S/CO
HCYS SERPL-MCNC: 5.5 UMOL/L
HGB A MFR BLD: 97.4 %
HGB A2 MFR BLD: 2.6 %
HGB BLD-MCNC: 11.2 G/DL
HGB BLD-MCNC: 11.8 G/DL
HGB FRACT BLD-IMP: NORMAL
HIV1+2 AB SPEC QL IA.RAPID: NONREACTIVE
HIV1+2 AB SPEC QL IA.RAPID: NONREACTIVE
IMM GRANULOCYTES NFR BLD AUTO: 0.2 %
IMM GRANULOCYTES NFR BLD AUTO: 1 %
LEAD BLD-MCNC: <1 UG/DL
LYMPHOCYTES # BLD AUTO: 1.4 K/UL
LYMPHOCYTES # BLD AUTO: 2 K/UL
LYMPHOCYTES NFR BLD AUTO: 17.1 %
LYMPHOCYTES NFR BLD AUTO: 23.3 %
MAN DIFF?: NORMAL
MAN DIFF?: NORMAL
MCHC RBC-ENTMCNC: 25.4 PG
MCHC RBC-ENTMCNC: 26.2 PG
MCHC RBC-ENTMCNC: 31.2 GM/DL
MCHC RBC-ENTMCNC: 31.5 GM/DL
MCV RBC AUTO: 80.8 FL
MCV RBC AUTO: 83.9 FL
MEV IGG FLD QL IA: 194 AU/ML
MEV IGG+IGM SER-IMP: POSITIVE
MONOCYTES # BLD AUTO: 0.38 K/UL
MONOCYTES # BLD AUTO: 0.5 K/UL
MONOCYTES NFR BLD AUTO: 4.6 %
MONOCYTES NFR BLD AUTO: 5.8 %
NEUTROPHILS # BLD AUTO: 5.88 K/UL
NEUTROPHILS # BLD AUTO: 6.21 K/UL
NEUTROPHILS NFR BLD AUTO: 68.5 %
NEUTROPHILS NFR BLD AUTO: 75.8 %
PLATELET # BLD AUTO: 219 K/UL
PLATELET # BLD AUTO: 249 K/UL
PROT C PPP CHRO-ACNC: 98 %
PROT S AG ACT/NOR PPP IA: 51 %
PTR INTERP: NORMAL
RBC # BLD: 4.28 M/UL
RBC # BLD: 4.64 M/UL
RBC # FLD: 13.9 %
RBC # FLD: 14.7 %
RUBV IGG FLD-ACNC: 24.6 INDEX
RUBV IGG SER-IMP: POSITIVE
SCREEN DRVVT: 30.4 SEC
SOURCE GBS: NORMAL
T PALLIDUM AB SER QL IA: NEGATIVE
T PALLIDUM AB SER QL IA: NEGATIVE
VZV AB TITR SER: POSITIVE
VZV IGG SER IF-ACNC: 1189 INDEX
WBC # FLD AUTO: 8.2 K/UL
WBC # FLD AUTO: 8.59 K/UL

## 2020-02-06 ENCOUNTER — APPOINTMENT (OUTPATIENT)
Dept: OBGYN | Facility: CLINIC | Age: 25
End: 2020-02-06
Payer: MEDICAID

## 2020-02-06 VITALS
HEART RATE: 76 BPM | DIASTOLIC BLOOD PRESSURE: 70 MMHG | BODY MASS INDEX: 21.69 KG/M2 | SYSTOLIC BLOOD PRESSURE: 115 MMHG | WEIGHT: 135 LBS | HEIGHT: 66 IN

## 2020-02-06 PROCEDURE — 0503F POSTPARTUM CARE VISIT: CPT

## 2020-02-06 NOTE — HISTORY OF PRESENT ILLNESS
[Postpartum Follow Up] : postpartum follow up [Female] : Delivery History: baby girl [Wt. ___] : weighing [unfilled] [Vaginal Delivery] : vaginal delivery [Delivery Date Was ___] : delivery date was [unfilled] [Infant's Name ___] : [unfilled] [Living at Home] : is currently living at home [Bottle Feeding] : bottle feeding [Breastfeeding] : currently nursing [Intended Contraception] : Intended Contraception: [Complications:___] : no complications [Rubella Vaccine] : Rubella vaccine was not administered [Rhogam] : Rhogam was not administered [Primary C/S] : delivered by  section [Pertussis Vaccine] : Pertussis vaccine was not administered [BTL] : no tubal ligation [Resumed Menses] : has not resumed her menses [BF with Difficulty] : nursing without difficulty [Resumed [place]] : has not resumed intercourse [IUD] : intrauterine device [Breast Pain] : no breast pain [S/Sx PP Depression] : no signs/symptoms of postpartum depression [Healed] : healed [Clean/Dry/Intact] : clean, dry and intact [Examination Of The Breasts] : breasts are normal [Back to Normal] : is back to normal in size [None] : no vaginal bleeding [Soft] : soft [Tender] : non tender [Distended] : not distended [de-identified] : Category 3 FHT [Doing Well] : is doing well

## 2022-03-24 NOTE — OB RN TRIAGE NOTE - PAIN RATING/NUMBER SCALE (0-10): ACTIVITY
Subha Wright is a 58 y.o. female and is here for a comprehensive physical exam. The patient reports problems - pelvic floor dysfunction.  Has been doing PT and seeing urogyn about myofascial pain- does not think IC.  Not having any bladder issues anymore (urgency, pain, etc) but still with pelvic floor issues.   She has appt with Dr. Moy Doran -colorectal- to be sure it's not related to any GI.   Pt is UTD with annual gyn exam and mammo - Tom  Tender knot on labia  Cut back on alcohol -now 2-3 weekly.      Social History     Socioeconomic History   • Marital status:    Tobacco Use   • Smoking status: Never Smoker   • Smokeless tobacco: Former User     Quit date: 1990   Vaping Use   • Vaping Use: Never used   Substance and Sexual Activity   • Alcohol use: Yes     Comment: OCCASIONALLY   • Drug use: No   • Sexual activity: Yes     Partners: Male     Birth control/protection: Surgical     Comment: vassectomy       Family History   Problem Relation Age of Onset   • Migraines Mother    • Stroke Mother 79   • Atrial fibrillation Mother    • Diabetes Father    • Hypertension Father    • Diabetes Sister    • Mental illness Sister    • Colon cancer Brother 59        lung, colon   • Skin cancer Brother    • No Known Problems Brother         History reviewed. No pertinent past medical history.       Current Outpatient Medications:   •  estradiol (VAGIFEM) 10 MCG tablet vaginal tablet, Insert 1 tablet into the vagina 2 (Two) Times a Week., Disp: , Rfl:     Review of Systems    Review of Systems   Constitutional: Negative for fatigue and unexpected weight loss.   HENT: Negative for trouble swallowing.    Eyes: Negative for visual disturbance.   Respiratory: Negative for shortness of breath.    Cardiovascular: Negative for chest pain and palpitations.   Gastrointestinal: Negative for abdominal pain and blood in stool.   Endocrine: Negative for cold intolerance.   Genitourinary: Positive for pelvic pain.  "Negative for breast lump and decreased libido.   Musculoskeletal: Negative for arthralgias and gait problem.   Neurological: Negative for memory problem.   Psychiatric/Behavioral: Negative for depressed mood.        There were no vitals filed for this visit.    Vitals:    03/24/22 0829   BP: 104/74   Pulse: 70   Temp: 97.3 °F (36.3 °C)   Weight: 69.9 kg (154 lb)   Height: 162.6 cm (64\")     Body mass index is 26.43 kg/m².  Wt Readings from Last 3 Encounters:   03/24/22 69.9 kg (154 lb)   12/14/21 69.4 kg (153 lb)   12/02/21 69.4 kg (153 lb)      Objective   The 10-year ASCVD risk score (Oswaldo ADRIAN Jr., et al., 2013) is: 2.2%    Values used to calculate the score:      Age: 58 years      Sex: Female      Is Non- : No      Diabetic: No      Tobacco smoker: No      Systolic Blood Pressure: 104 mmHg      Is BP treated: No      HDL Cholesterol: 55 mg/dL      Total Cholesterol: 249 mg/dL    Physical Exam  Constitutional:       General: She is not in acute distress.  Eyes:      Conjunctiva/sclera: Conjunctivae normal.   Neck:      Thyroid: No thyromegaly.   Cardiovascular:      Rate and Rhythm: Normal rate and regular rhythm.      Heart sounds: Normal heart sounds.   Pulmonary:      Effort: Pulmonary effort is normal.      Breath sounds: Normal breath sounds.   Abdominal:      General: Bowel sounds are normal.      Palpations: Abdomen is soft.   Genitourinary:      Lymphadenopathy:      Cervical: No cervical adenopathy.   Skin:     General: Skin is warm and dry.   Neurological:      Mental Status: She is alert and oriented to person, place, and time.   Psychiatric:         Behavior: Behavior normal.         Thought Content: Thought content normal.         Judgment: Judgment normal.         Procedures       Assessment/Plan         Diagnoses and all orders for this visit:    1. Pelvic pain (Primary)  Comments:  reviewed colleagues notes, agree with plan.  Will follow along.     2. Epidermoid cyst of " jeanine araiza  Comments:  reassured    3. Encounter for hepatitis C screening test for low risk patient  Comments:  will check one time per CDC recommendations.   Orders:  -     Hepatitis C Antibody; Future    4. Physical exam, annual  Comments:  Labs reviewed, exam favorable.  Chol has gone up but still at low 10 year risk. Continue her healthy diet plan and recheck in 1 year/prn  Orders:  -     CBC & Differential; Future  -     Comprehensive Metabolic Panel; Future  -     Lipid Panel With / Chol / HDL Ratio; Future        Return in about 1 year (around 3/24/2023) for Annual physical, Lab Before FUP.            7

## 2022-10-11 NOTE — ED PROVIDER NOTE - ATTENDING CONTRIBUTION TO CARE
LOV: 4/7/22 (med check-asked to return in 6 months)  Last Refill: 7/19/22, #91, 0 RF  Next OV: n/a    Protocol passed.      [facility] sent to pt to schedule an annual.
agree with above. young female with pos preg test ectopic vs threatened ab. will repeat hcg transvag us

## 2024-01-11 NOTE — OB RN TRIAGE NOTE - BP NONINVASIVE DIASTOLIC (MM HG)
Refill request for: Topiramate    Directions: Take up to 4 tabs at night     LOV: 11/03/23  NOV: 2/27/24    30 day supply with 0 refills Medication T'd for review and signature    64